# Patient Record
Sex: FEMALE | Race: BLACK OR AFRICAN AMERICAN | NOT HISPANIC OR LATINO | Employment: FULL TIME | URBAN - METROPOLITAN AREA
[De-identification: names, ages, dates, MRNs, and addresses within clinical notes are randomized per-mention and may not be internally consistent; named-entity substitution may affect disease eponyms.]

---
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
## 2021-01-05 ENCOUNTER — POSTPARTUM VISIT (OUTPATIENT)
Dept: OBSTETRICS AND GYNECOLOGY | Facility: CLINIC | Age: 21
End: 2021-01-05

## 2021-01-05 VITALS
HEART RATE: 77 BPM | WEIGHT: 154 LBS | DIASTOLIC BLOOD PRESSURE: 63 MMHG | HEIGHT: 67 IN | SYSTOLIC BLOOD PRESSURE: 105 MMHG | BODY MASS INDEX: 24.17 KG/M2

## 2021-01-05 PROCEDURE — 0503F POSTPARTUM CARE VISIT: CPT | Performed by: OBSTETRICS & GYNECOLOGY

## 2021-01-05 NOTE — PROGRESS NOTES
"Chief Complaint   Patient presents with   • Postpartum Care     Pt desires the nexplanon for contraception         SUBJECTIVE:   Bandar Rodriges is a 20 y.o.  who presents s/p  Precipitous Vaginal Delivery on 2020.  Reports no issues  Bowel and bladder function have returned to normal  Mood changes none  both breast and bottle feeding due to decreased milk supply    OB History    Para Term  AB Living   1 1 1 0 0 1   SAB TAB Ectopic Molar Multiple Live Births   0 0 0   0 1      # Outcome Date GA Lbr Sanjiv/2nd Weight Sex Delivery Anes PTL Lv   1 Term 20 39w5d 11:19  00:49 3546 g (7 lb 13.1 oz) M Vag-Spont EPI N TAMMY      Birth Comments: scale 4          Past Medical History:   Diagnosis Date   • Urogenital trichomoniasis      Past Surgical History:   Procedure Laterality Date   • WISDOM TOOTH EXTRACTION       Social History     Tobacco Use   • Smoking status: Never Smoker   • Smokeless tobacco: Never Used   Substance Use Topics   • Alcohol use: No   • Drug use: Yes     Types: Marijuana     Comment: stopped after prengancy      Family History   Problem Relation Age of Onset   • Hypertension Mother    • Breast cancer Neg Hx    • Ovarian cancer Neg Hx    • Uterine cancer Neg Hx    • Colon cancer Neg Hx    • Deep vein thrombosis Neg Hx    • Pulmonary embolism Neg Hx        Patient Active Problem List   Diagnosis   • Term pregnancy        OBJECTIVE:   /63   Pulse 77   Ht 170.2 cm (67.01\")   Wt 69.9 kg (154 lb)   Breastfeeding Yes   BMI 24.11 kg/m²    Physical Examination:   General appearance - alert, well appearing, and in no distress  Breasts - deferred  Chest - no tachypnea, retractions or cyanosis  Heart - normal rate and regular rhythm  Abdomen - soft, nontender, nondistended, no masses or organomegaly;   Pelvic - normal external genitalia, vulva, vagina, cervix, uterus and adnexa, no bleeding  Neurological - screening mental status exam normal  Musculoskeletal - no joint " tenderness, deformity or swelling  Psychiatric - normal mood and affect    Lab Results   Component Value Date    WBC 14.16 (H) 2020    HGB 8.6 (L) 2020    HCT 25.5 (L) 2020    MCV 84.7 2020     2020        ASSESSMENT:   S/p     PLAN:   Doing well postpartum  Baby doing well  Contraception: Desires Nexplanon. Aware of risks including but not limited to bleeding, infection, bruising, injury to surrounding structures, unscheduled/irregular bleeding, need for other surgery/procedure including at time of removal  At this time, may resume normal activity including intercourse and lifting.  Reviewed normal precautions.  Reviewed pap smear due at 20 yo  Recommended follow up for annual exam or sooner with problems

## 2021-02-02 ENCOUNTER — OFFICE VISIT (OUTPATIENT)
Dept: OBSTETRICS AND GYNECOLOGY | Facility: CLINIC | Age: 21
End: 2021-02-02

## 2021-02-02 VITALS
HEART RATE: 78 BPM | BODY MASS INDEX: 24.48 KG/M2 | SYSTOLIC BLOOD PRESSURE: 109 MMHG | WEIGHT: 156 LBS | DIASTOLIC BLOOD PRESSURE: 73 MMHG | HEIGHT: 67 IN

## 2021-02-02 DIAGNOSIS — Z30.017 ENCOUNTER FOR INITIAL PRESCRIPTION OF IMPLANTABLE SUBDERMAL CONTRACEPTIVE: Primary | ICD-10-CM

## 2021-02-02 LAB
B-HCG UR QL: NEGATIVE
INTERNAL NEGATIVE CONTROL: NEGATIVE
INTERNAL POSITIVE CONTROL: POSITIVE
Lab: NORMAL

## 2021-02-02 PROCEDURE — 81025 URINE PREGNANCY TEST: CPT | Performed by: OBSTETRICS & GYNECOLOGY

## 2021-02-02 PROCEDURE — 11981 INSERTION DRUG DLVR IMPLANT: CPT | Performed by: OBSTETRICS & GYNECOLOGY

## 2021-02-02 NOTE — PROGRESS NOTES
Procedure Note     Pre-Operative Diagnosis: 1. Desire for long acting reversible contraception with Nexplanon    Post-Operative Diagnosis: Same     Procedure: Nexplanon insertion in left arm     Anesthetic: 3mL of 1% lidocaine without epinephrine    Estimated Blood Loss: Nil     Complications: no complications were noted     Disposition: home in good condition     Description of Procedure:   Patient was consented for procedure; questions were answered. Urine pregnancy test was negative.  Patient's dominant hand is her left hand.  Risks were explained to be including but not limited to bruising, bleeding, need for additional surgeries or procedures, injury to surrounding vessels or nerves.  Insertion site was measured, 8cm from medial epicondyle per 's instructions. 3mL of 1% lidocaine injected at insertion site and along insertion track. The skin was then prepped with betadine. The device was inserted in compliance with the 's instructions. The patient was instructed to feel the device after placement. A bandage was applied to the insertion site. Hemostasis was noted. Patient tolerated the procedure well. She was given the package insert and insertion card and instructed to have the device removed in 3 years.       Kasia Bond MD

## 2021-04-16 ENCOUNTER — BULK ORDERING (OUTPATIENT)
Dept: CASE MANAGEMENT | Facility: OTHER | Age: 21
End: 2021-04-16

## 2021-04-16 DIAGNOSIS — Z23 IMMUNIZATION DUE: ICD-10-CM

## 2022-05-27 ENCOUNTER — TELEPHONE (OUTPATIENT)
Dept: OBSTETRICS AND GYNECOLOGY | Facility: CLINIC | Age: 22
End: 2022-05-27

## 2022-08-30 ENCOUNTER — OFFICE VISIT (OUTPATIENT)
Dept: OBSTETRICS AND GYNECOLOGY | Facility: CLINIC | Age: 22
End: 2022-08-30

## 2022-08-30 VITALS — BODY MASS INDEX: 23.17 KG/M2 | SYSTOLIC BLOOD PRESSURE: 112 MMHG | WEIGHT: 148 LBS | DIASTOLIC BLOOD PRESSURE: 65 MMHG

## 2022-08-30 DIAGNOSIS — Z30.46 NEXPLANON REMOVAL: Primary | ICD-10-CM

## 2022-08-30 PROCEDURE — 11982 REMOVE DRUG IMPLANT DEVICE: CPT | Performed by: NURSE PRACTITIONER

## 2022-08-30 NOTE — PROGRESS NOTES
CC: nexplanon removal    Procedure Note     Pre-Operative Diagnosis: 1. Desire for removal of Nexplanon device from left arm    Post-Operative Diagnosis: Same     Procedure: Nexplanon removal    Anesthetic: 3mL 1% plain Xylocaine     Estimated Blood Loss: Nil     Complications: no complications were noted     Counseling: Patient was seen and counseled.  Patient desires removal of the implant device due to AUB with device in place. She is also considering pregnancy.  She was counseled on other contraceptive options.  She was counseled on prenatal vitamin use if no contraceptive option chosen and healthy timing/spacing of pregnancy. The risks of removal were reviewed including bleeding, infection, damage to surrounding structures, and scarring.    Description of Procedure:   Patient was consented for procedure; questions were answered. The implant was identified in her left upper extremity . The area of the distal implant was cleaned with alcohol and injected with 3mL of 1% lidocaine.  The skin was prepped with betadine and incised approximately 0.5cm parallel to the plane of the implant.  The implant was identified and grasped with a hemostat.  The overlying capsule was dissected off with a hemostat, and the implant was removed in its entirety.  The skin was closed with steri strips.  The incision was covered with a pressure dressing.  Patient tolerated the procedure well.     Assessment  Diagnoses and all orders for this visit:    1. Nexplanon removal (Primary)    Encouraged daily PNV and folic acid    NORRIS Rick   8/30/2022  15:12 EDT

## 2023-02-06 NOTE — PROGRESS NOTES
"OB follow up     Bandar Rodriges is a 20 y.o.  34w2d being seen today for her obstetrical visit.  Patient reports pelvic pain after sitting for long periods of time. Feels like \"bones are grinding together\". Fetal movement: normal.    Her prenatal care is complicated by (and status): none    Review of Systems  Cramping/contractions : denies  Vaginal bleeding: denies  Fetal movement normal    /65   Wt 74.8 kg (165 lb)   LMP 2020 (Exact Date)     FHT: 135 BPM   Uterine Size: 32 cm       Assessment    1) Pregnancy at 34w2d   No longer patterning blood sugars as results were all WNL  Flu Vaccine received  Improvement in [place] since decreasing to 8 hour shifts  Encouraged tylenol and maternity belt for pelvic pain  Reviewed fetal kick counts and PTL S&S  GBS at next visit.    Plan    Reviewed this stage of pregnancy  Problem list updated   Follow up in 2 weeks    Peyton Rivera, APRN  10/21/2020  15:36 EDT      " Metronidazole Pregnancy And Lactation Text: This medication is Pregnancy Category B and considered safe during pregnancy.  It is also excreted in breast milk.

## 2023-02-27 ENCOUNTER — INITIAL PRENATAL (OUTPATIENT)
Dept: OBSTETRICS AND GYNECOLOGY | Facility: CLINIC | Age: 23
End: 2023-02-27
Payer: COMMERCIAL

## 2023-02-27 VITALS — DIASTOLIC BLOOD PRESSURE: 74 MMHG | SYSTOLIC BLOOD PRESSURE: 114 MMHG | BODY MASS INDEX: 25.05 KG/M2 | WEIGHT: 160 LBS

## 2023-02-27 DIAGNOSIS — O46.8X1 SUBCHORIONIC HEMORRHAGE OF PLACENTA IN FIRST TRIMESTER, SINGLE OR UNSPECIFIED FETUS: ICD-10-CM

## 2023-02-27 DIAGNOSIS — O41.8X10 SUBCHORIONIC HEMORRHAGE OF PLACENTA IN FIRST TRIMESTER, SINGLE OR UNSPECIFIED FETUS: ICD-10-CM

## 2023-02-27 DIAGNOSIS — Z34.81 MULTIGRAVIDA IN FIRST TRIMESTER: Primary | ICD-10-CM

## 2023-02-27 LAB
GLUCOSE UR STRIP-MCNC: NEGATIVE MG/DL
PROT UR STRIP-MCNC: NEGATIVE MG/DL

## 2023-02-27 PROCEDURE — 99214 OFFICE O/P EST MOD 30 MIN: CPT | Performed by: OBSTETRICS & GYNECOLOGY

## 2023-02-28 LAB
ABO GROUP BLD: NORMAL
BASOPHILS # BLD AUTO: 0 X10E3/UL (ref 0–0.2)
BASOPHILS NFR BLD AUTO: 0 %
BLD GP AB SCN SERPL QL: NEGATIVE
EOSINOPHIL # BLD AUTO: 0.1 X10E3/UL (ref 0–0.4)
EOSINOPHIL NFR BLD AUTO: 1 %
ERYTHROCYTE [DISTWIDTH] IN BLOOD BY AUTOMATED COUNT: 12.9 % (ref 11.7–15.4)
HBV SURFACE AG SERPL QL IA: NEGATIVE
HCT VFR BLD AUTO: 38 % (ref 34–46.6)
HCV IGG SERPL QL IA: NON REACTIVE
HGB BLD-MCNC: 12.8 G/DL (ref 11.1–15.9)
HIV 1+2 AB+HIV1 P24 AG SERPL QL IA: NON REACTIVE
IMM GRANULOCYTES # BLD AUTO: 0 X10E3/UL (ref 0–0.1)
IMM GRANULOCYTES NFR BLD AUTO: 0 %
LYMPHOCYTES # BLD AUTO: 2.9 X10E3/UL (ref 0.7–3.1)
LYMPHOCYTES NFR BLD AUTO: 37 %
MCH RBC QN AUTO: 29.2 PG (ref 26.6–33)
MCHC RBC AUTO-ENTMCNC: 33.7 G/DL (ref 31.5–35.7)
MCV RBC AUTO: 87 FL (ref 79–97)
MONOCYTES # BLD AUTO: 0.6 X10E3/UL (ref 0.1–0.9)
MONOCYTES NFR BLD AUTO: 8 %
NEUTROPHILS # BLD AUTO: 4.1 X10E3/UL (ref 1.4–7)
NEUTROPHILS NFR BLD AUTO: 54 %
PLATELET # BLD AUTO: 239 X10E3/UL (ref 150–450)
RBC # BLD AUTO: 4.38 X10E6/UL (ref 3.77–5.28)
RH BLD: POSITIVE
RPR SER QL: NON REACTIVE
RUBV IGG SERPL IA-ACNC: 2.86 INDEX
VZV IGG SER IA-ACNC: 830 INDEX
WBC # BLD AUTO: 7.7 X10E3/UL (ref 3.4–10.8)

## 2023-02-28 NOTE — PROGRESS NOTES
Cc:  First visit for new pregnancy  Patient was attempting pregnancy and has not been on contraception.  Home testing was positive.  She denies any significant pregnancy symptoms except appetite issues.  Past medical, surgical, obstetric, genetic and social histories reviewed by me.  Allergies and medications reviewed.  Physical exam documented in chart.  Prenatal labs ordered.  Ultrasound with small gestational sac and yolk sac.  No defined fetal pole.  Small subchorionic hemorrhage.  A/P:  IUP in first trimester with subchorionic hemorrhage.  - Repeat sonogram in 2 weeks for viability.  - Discussed importance of stopping THC.  - Recommended vaccinations in pregnancy.  - Discussed set up of our practice, timing of ultrasound and taking of vitamins.

## 2023-03-01 LAB
A VAGINAE DNA VAG QL NAA+PROBE: NORMAL SCORE
BVAB2 DNA VAG QL NAA+PROBE: NORMAL SCORE
C ALBICANS DNA VAG QL NAA+PROBE: NEGATIVE
C GLABRATA DNA VAG QL NAA+PROBE: NEGATIVE
C TRACH DNA VAG QL NAA+PROBE: NEGATIVE
MEGA1 DNA VAG QL NAA+PROBE: NORMAL SCORE
N GONORRHOEA DNA VAG QL NAA+PROBE: NEGATIVE
T VAGINALIS DNA VAG QL NAA+PROBE: NEGATIVE

## 2023-03-02 ENCOUNTER — TELEPHONE (OUTPATIENT)
Dept: OBSTETRICS AND GYNECOLOGY | Facility: CLINIC | Age: 23
End: 2023-03-02

## 2023-03-02 ENCOUNTER — TELEPHONE (OUTPATIENT)
Dept: OBSTETRICS AND GYNECOLOGY | Facility: CLINIC | Age: 23
End: 2023-03-02
Payer: COMMERCIAL

## 2023-03-02 LAB
BACTERIA UR CULT: ABNORMAL
BACTERIA UR CULT: ABNORMAL
OTHER ANTIBIOTIC SUSC ISLT: ABNORMAL

## 2023-03-02 RX ORDER — CEPHALEXIN 500 MG/1
500 CAPSULE ORAL 3 TIMES DAILY
Qty: 21 CAPSULE | Refills: 0 | Status: SHIPPED | OUTPATIENT
Start: 2023-03-02 | End: 2023-03-09

## 2023-03-02 NOTE — TELEPHONE ENCOUNTER
Left message for Rabia that Dr Montaño said that based on the urine sample left in the office, you have a UTI. He has sent in Keflex, an antiobiotic, to your [facility] pharmacy at [address] [facility]. Please increase your water intake to try to get the infection out of your urinary tract and also if needed, eat a cracker with your antibiotic to avoid nausea. We hope you feel better soon. Thank you.

## 2023-03-02 NOTE — TELEPHONE ENCOUNTER
Dora    Let her know that she has a urinary tract infection based on urine sample she left in office.    I called in antibiotics.    Thanks    Danyel

## 2023-03-03 LAB
CONV .: NORMAL
CYTOLOGIST CVX/VAG CYTO: NORMAL
CYTOLOGY CVX/VAG DOC CYTO: NORMAL
CYTOLOGY CVX/VAG DOC THIN PREP: NORMAL
DX ICD CODE: NORMAL
HIV 1 & 2 AB SER-IMP: NORMAL
OTHER STN SPEC: NORMAL
STAT OF ADQ CVX/VAG CYTO-IMP: NORMAL

## 2023-03-20 ENCOUNTER — ROUTINE PRENATAL (OUTPATIENT)
Dept: OBSTETRICS AND GYNECOLOGY | Facility: CLINIC | Age: 23
End: 2023-03-20
Payer: COMMERCIAL

## 2023-03-20 VITALS — BODY MASS INDEX: 23.49 KG/M2 | SYSTOLIC BLOOD PRESSURE: 110 MMHG | WEIGHT: 150 LBS | DIASTOLIC BLOOD PRESSURE: 73 MMHG

## 2023-03-20 DIAGNOSIS — O46.8X1 SUBCHORIONIC HEMORRHAGE OF PLACENTA IN FIRST TRIMESTER, SINGLE OR UNSPECIFIED FETUS: ICD-10-CM

## 2023-03-20 DIAGNOSIS — Z34.81 MULTIGRAVIDA IN FIRST TRIMESTER: Primary | ICD-10-CM

## 2023-03-20 DIAGNOSIS — Z3A.08 8 WEEKS GESTATION OF PREGNANCY: ICD-10-CM

## 2023-03-20 DIAGNOSIS — O41.8X10 SUBCHORIONIC HEMORRHAGE OF PLACENTA IN FIRST TRIMESTER, SINGLE OR UNSPECIFIED FETUS: ICD-10-CM

## 2023-03-20 LAB
GLUCOSE UR STRIP-MCNC: NEGATIVE MG/DL
PROT UR STRIP-MCNC: ABNORMAL MG/DL

## 2023-03-20 PROCEDURE — 99213 OFFICE O/P EST LOW 20 MIN: CPT | Performed by: OBSTETRICS & GYNECOLOGY

## 2023-03-20 RX ORDER — PROMETHAZINE HYDROCHLORIDE 12.5 MG/1
12.5 TABLET ORAL EVERY 6 HOURS PRN
Qty: 20 TABLET | Refills: 0 | Status: SHIPPED | OUTPATIENT
Start: 2023-03-20

## 2023-03-20 NOTE — PROGRESS NOTES
Cc:  Pregnancy follow up.  Patient with nausea and vomiting.  She also having diarrhea. She states earlier today she was not able to keep down any liquids.  No bleeding or spotting.  Vitals reviewed by me.  Gen - alert and pleasant.  Abdomen - gravid, nontender, no guarding or rebound.  Ultrasound with viable 8 week pregnancy.  Small subchorionic hemorrhage noted.  A/P:  IUP at 8 weeks with nausea, hematoma  - Nausea.  Unisom and B complex as first line treatment, the promethazine if does not work.  Discussed dietary modifications.  - Subchorionic hematoma.  Follow up in 3 to 4 weeks and repeat sonogram.

## 2023-04-13 ENCOUNTER — ROUTINE PRENATAL (OUTPATIENT)
Dept: OBSTETRICS AND GYNECOLOGY | Facility: CLINIC | Age: 23
End: 2023-04-13
Payer: COMMERCIAL

## 2023-04-13 ENCOUNTER — HOSPITAL ENCOUNTER (INPATIENT)
Facility: HOSPITAL | Age: 23
LOS: 2 days | Discharge: HOME | DRG: 831 | End: 2023-04-16
Attending: OBSTETRICS & GYNECOLOGY | Admitting: OBSTETRICS & GYNECOLOGY
Payer: COMMERCIAL

## 2023-04-13 VITALS — SYSTOLIC BLOOD PRESSURE: 137 MMHG | DIASTOLIC BLOOD PRESSURE: 65 MMHG | BODY MASS INDEX: 20.51 KG/M2 | WEIGHT: 131 LBS

## 2023-04-13 DIAGNOSIS — O46.8X1 SUBCHORIONIC HEMORRHAGE OF PLACENTA IN FIRST TRIMESTER, SINGLE OR UNSPECIFIED FETUS: ICD-10-CM

## 2023-04-13 DIAGNOSIS — Z34.81 MULTIGRAVIDA IN FIRST TRIMESTER: Primary | ICD-10-CM

## 2023-04-13 DIAGNOSIS — O41.8X10 SUBCHORIONIC HEMORRHAGE OF PLACENTA IN FIRST TRIMESTER, SINGLE OR UNSPECIFIED FETUS: ICD-10-CM

## 2023-04-13 DIAGNOSIS — Z3A.11 11 WEEKS GESTATION OF PREGNANCY: ICD-10-CM

## 2023-04-13 DIAGNOSIS — O21.9 NAUSEA AND VOMITING DURING PREGNANCY: ICD-10-CM

## 2023-04-13 PROBLEM — Z34.90 PREGNANCY: Status: ACTIVE | Noted: 2023-04-13

## 2023-04-13 LAB
ALBUMIN SERPL-MCNC: 5 G/DL (ref 3.5–5.2)
ALBUMIN/GLOB SERPL: 2 G/DL
ALP SERPL-CCNC: 65 U/L (ref 39–117)
ALT SERPL W P-5'-P-CCNC: 30 U/L (ref 1–33)
ANION GAP SERPL CALCULATED.3IONS-SCNC: 15.4 MMOL/L (ref 5–15)
AST SERPL-CCNC: 19 U/L (ref 1–32)
BASOPHILS # BLD AUTO: 0.03 10*3/MM3 (ref 0–0.2)
BASOPHILS NFR BLD AUTO: 0.2 % (ref 0–1.5)
BILIRUB SERPL-MCNC: 1.1 MG/DL (ref 0–1.2)
BUN SERPL-MCNC: 13 MG/DL (ref 6–20)
BUN/CREAT SERPL: 21.3 (ref 7–25)
CALCIUM SPEC-SCNC: 10.1 MG/DL (ref 8.6–10.5)
CHLORIDE SERPL-SCNC: 90 MMOL/L (ref 98–107)
CO2 SERPL-SCNC: 27.6 MMOL/L (ref 22–29)
CREAT SERPL-MCNC: 0.61 MG/DL (ref 0.57–1)
DEPRECATED RDW RBC AUTO: 38.2 FL (ref 37–54)
EGFRCR SERPLBLD CKD-EPI 2021: 129.8 ML/MIN/1.73
EOSINOPHIL # BLD AUTO: 0.04 10*3/MM3 (ref 0–0.4)
EOSINOPHIL NFR BLD AUTO: 0.3 % (ref 0.3–6.2)
ERYTHROCYTE [DISTWIDTH] IN BLOOD BY AUTOMATED COUNT: 13 % (ref 12.3–15.4)
GLOBULIN UR ELPH-MCNC: 2.5 GM/DL
GLUCOSE SERPL-MCNC: 89 MG/DL (ref 65–99)
GLUCOSE UR STRIP-MCNC: NEGATIVE MG/DL
HCT VFR BLD AUTO: 44.6 % (ref 34–46.6)
HGB BLD-MCNC: 16.2 G/DL (ref 12–15.9)
IMM GRANULOCYTES # BLD AUTO: 0.05 10*3/MM3 (ref 0–0.05)
IMM GRANULOCYTES NFR BLD AUTO: 0.4 % (ref 0–0.5)
LYMPHOCYTES # BLD AUTO: 2.23 10*3/MM3 (ref 0.7–3.1)
LYMPHOCYTES NFR BLD AUTO: 17.3 % (ref 19.6–45.3)
MCH RBC QN AUTO: 29.9 PG (ref 26.6–33)
MCHC RBC AUTO-ENTMCNC: 36.3 G/DL (ref 31.5–35.7)
MCV RBC AUTO: 82.3 FL (ref 79–97)
MONOCYTES # BLD AUTO: 1.08 10*3/MM3 (ref 0.1–0.9)
MONOCYTES NFR BLD AUTO: 8.4 % (ref 5–12)
NEUTROPHILS NFR BLD AUTO: 73.4 % (ref 42.7–76)
NEUTROPHILS NFR BLD AUTO: 9.46 10*3/MM3 (ref 1.7–7)
NRBC BLD AUTO-RTO: 0 /100 WBC (ref 0–0.2)
PLATELET # BLD AUTO: 313 10*3/MM3 (ref 140–450)
PMV BLD AUTO: 9.8 FL (ref 6–12)
POTASSIUM SERPL-SCNC: 2.8 MMOL/L (ref 3.5–5.2)
PROT SERPL-MCNC: 7.5 G/DL (ref 6–8.5)
PROT UR STRIP-MCNC: ABNORMAL MG/DL
RBC # BLD AUTO: 5.42 10*6/MM3 (ref 3.77–5.28)
SODIUM SERPL-SCNC: 133 MMOL/L (ref 136–145)
WBC NRBC COR # BLD: 12.89 10*3/MM3 (ref 3.4–10.8)

## 2023-04-13 PROCEDURE — 81001 URINALYSIS AUTO W/SCOPE: CPT | Performed by: OBSTETRICS & GYNECOLOGY

## 2023-04-13 PROCEDURE — 80307 DRUG TEST PRSMV CHEM ANLYZR: CPT | Performed by: OBSTETRICS & GYNECOLOGY

## 2023-04-13 PROCEDURE — 0 POTASSIUM CHLORIDE 10 MEQ/100ML SOLUTION: Performed by: STUDENT IN AN ORGANIZED HEALTH CARE EDUCATION/TRAINING PROGRAM

## 2023-04-13 PROCEDURE — 80053 COMPREHEN METABOLIC PANEL: CPT | Performed by: OBSTETRICS & GYNECOLOGY

## 2023-04-13 PROCEDURE — 25010000002 ONDANSETRON PER 1 MG: Performed by: OBSTETRICS & GYNECOLOGY

## 2023-04-13 PROCEDURE — 85025 COMPLETE CBC W/AUTO DIFF WBC: CPT | Performed by: OBSTETRICS & GYNECOLOGY

## 2023-04-13 RX ORDER — SODIUM CHLORIDE 0.9 % (FLUSH) 0.9 %
10 SYRINGE (ML) INJECTION EVERY 12 HOURS SCHEDULED
OUTPATIENT
Start: 2023-04-13

## 2023-04-13 RX ORDER — SODIUM CHLORIDE 0.9 % (FLUSH) 0.9 %
10 SYRINGE (ML) INJECTION AS NEEDED
OUTPATIENT
Start: 2023-04-13

## 2023-04-13 RX ORDER — SODIUM CHLORIDE, SODIUM LACTATE, POTASSIUM CHLORIDE, CALCIUM CHLORIDE 600; 310; 30; 20 MG/100ML; MG/100ML; MG/100ML; MG/100ML
125 INJECTION, SOLUTION INTRAVENOUS CONTINUOUS
Status: DISCONTINUED | OUTPATIENT
Start: 2023-04-13 | End: 2023-04-14

## 2023-04-13 RX ORDER — DIPHENHYDRAMINE HCL 12.5MG/5ML
12.5 LIQUID (ML) ORAL EVERY 6 HOURS PRN
Status: DISCONTINUED | OUTPATIENT
Start: 2023-04-13 | End: 2023-04-14

## 2023-04-13 RX ORDER — ACETAMINOPHEN 325 MG/1
650 TABLET ORAL EVERY 6 HOURS PRN
Status: DISCONTINUED | OUTPATIENT
Start: 2023-04-13 | End: 2023-04-16 | Stop reason: HOSPADM

## 2023-04-13 RX ORDER — ONDANSETRON 2 MG/ML
4 INJECTION INTRAMUSCULAR; INTRAVENOUS EVERY 6 HOURS PRN
Status: DISCONTINUED | OUTPATIENT
Start: 2023-04-13 | End: 2023-04-16 | Stop reason: HOSPADM

## 2023-04-13 RX ORDER — POTASSIUM CHLORIDE 7.45 MG/ML
10 INJECTION INTRAVENOUS
Status: DISCONTINUED | OUTPATIENT
Start: 2023-04-13 | End: 2023-04-14

## 2023-04-13 RX ORDER — LIDOCAINE HYDROCHLORIDE 10 MG/ML
5 INJECTION, SOLUTION EPIDURAL; INFILTRATION; INTRACAUDAL; PERINEURAL AS NEEDED
OUTPATIENT
Start: 2023-04-13

## 2023-04-13 RX ORDER — FAMOTIDINE 10 MG/ML
20 INJECTION, SOLUTION INTRAVENOUS EVERY 12 HOURS SCHEDULED
Status: DISCONTINUED | OUTPATIENT
Start: 2023-04-13 | End: 2023-04-16 | Stop reason: HOSPADM

## 2023-04-13 RX ORDER — FAMOTIDINE 10 MG/ML
20 INJECTION, SOLUTION INTRAVENOUS EVERY 12 HOURS SCHEDULED
Status: DISCONTINUED | OUTPATIENT
Start: 2023-04-13 | End: 2023-04-13

## 2023-04-13 RX ADMIN — ONDANSETRON 4 MG: 2 INJECTION INTRAMUSCULAR; INTRAVENOUS at 22:51

## 2023-04-13 RX ADMIN — SODIUM CHLORIDE, POTASSIUM CHLORIDE, SODIUM LACTATE AND CALCIUM CHLORIDE 200 ML/HR: 600; 310; 30; 20 INJECTION, SOLUTION INTRAVENOUS at 23:00

## 2023-04-13 RX ADMIN — ONDANSETRON 4 MG: 2 INJECTION INTRAMUSCULAR; INTRAVENOUS at 16:31

## 2023-04-13 RX ADMIN — FAMOTIDINE 20 MG: 10 INJECTION INTRAVENOUS at 16:31

## 2023-04-13 RX ADMIN — SODIUM CHLORIDE, POTASSIUM CHLORIDE, SODIUM LACTATE AND CALCIUM CHLORIDE 125 ML/HR: 600; 310; 30; 20 INJECTION, SOLUTION INTRAVENOUS at 16:10

## 2023-04-13 RX ADMIN — POTASSIUM CHLORIDE 10 MEQ: 7.46 INJECTION, SOLUTION INTRAVENOUS at 23:42

## 2023-04-13 RX ADMIN — POTASSIUM CHLORIDE 10 MEQ: 7.46 INJECTION, SOLUTION INTRAVENOUS at 21:39

## 2023-04-13 NOTE — PLAN OF CARE
Goal Outcome Evaluation:              Outcome Evaluation: Admitted to antepartum service for supportive care for hyperemesis. Labs obtained. Fluids started. medications given as orered. Pt NPO at this time.

## 2023-04-13 NOTE — LETTER
April 16, 2023      [facility]  [address]  [facility] [address]  [phone]          Patient: Bandar Rodriges   YOB: 2000   Date of Visit: 4/13/2023       To Whom It May Concern:    Bandar Rodriges was seen at [facility] on 4/13/2023.    Please excuse Bandar Rodriges from work today from 4/13/23 to 4/16/23.        Sincerely,       JOVI Hatfield RN

## 2023-04-13 NOTE — PROGRESS NOTES
Cc:  Pregnancy follow up.  Patient not able to keep any solid or liquids down for about 3 weeks.  She is not on any medication even though Promethazine was prescribed in past.   She has lost 29 lbs from initial weight intake. She also has heartburn and states she vomits stomach acid.  No bleeding or spotting.  Vitals reviewed.  Gen - patient appears ill.  Abdomen - no guarding or rebound.  Ultrasound with viable 11 week pregnancy  A/P:  IUP at 11+ weeks with hyperemesis gravidarum, history of subchorionic hemorrhage.  - Hyper E.  Patient to be admitted due to severe form of nausea/vomiting.  - Subchorionic hemorrhage.  Resolved.  - Discussed importance of remaining quit from THC and maternal well being.

## 2023-04-13 NOTE — H&P
Patient is a 23 y.o. female  at 11+ weeks admitted for hyperemesis gravidarum.  Patient with ongoing nausea/vomiting that has not been amenable to outpatient treatment.  She has lost nearly 30 lbs since beginning of pregnancy.    Patient Active Problem List   Diagnosis   • Term pregnancy     Prenatal care is complicated by hyperemesis, subchorionic hematoma in first trimester.    Past Medical History:   Diagnosis Date   • Urogenital trichomoniasis        Past Surgical History:   Procedure Laterality Date   • WISDOM TOOTH EXTRACTION         No Known Allergies    Social History     Socioeconomic History   • Marital status: Single   Tobacco Use   • Smoking status: Never   • Smokeless tobacco: Never   Vaping Use   • Vaping Use: Former   • Substances: Nicotine   Substance and Sexual Activity   • Alcohol use: Not Currently   • Drug use: Yes     Types: Marijuana   • Sexual activity: Yes     Partners: Male     Birth control/protection: None       Physical Exam  Gen: ill appearing  Vitals:    23 1515   BP: 114/75   Pulse: 113   Resp: 18   Temp: 97.7 °F (36.5 °C)   SpO2: 99%     HEENT: WNL  Abdomen: No guarding or rebound.      Assessment/Plan: Hyperemesis Gravidarum in 1st trimester  - Labs ordered  - Antiemetics, IVF and strict NPO.        @[facility] Center[facility]@

## 2023-04-14 PROBLEM — O21.1 HYPEREMESIS GRAVIDARUM WITH METABOLIC DISTURBANCE, ANTEPARTUM: Status: ACTIVE | Noted: 2023-04-14

## 2023-04-14 PROBLEM — Z34.90 PREGNANCY: Status: RESOLVED | Noted: 2023-04-13 | Resolved: 2023-04-14

## 2023-04-14 PROBLEM — E87.6 HYPOKALEMIA DUE TO EXCESSIVE GASTROINTESTINAL LOSS OF POTASSIUM: Status: ACTIVE | Noted: 2023-04-14

## 2023-04-14 LAB
AMPHET+METHAMPHET UR QL: NEGATIVE
BACTERIA UR QL AUTO: ABNORMAL /HPF
BARBITURATES UR QL SCN: NEGATIVE
BENZODIAZ UR QL SCN: NEGATIVE
BILIRUB UR QL STRIP: NEGATIVE
CANNABINOIDS SERPL QL: POSITIVE
CLARITY UR: ABNORMAL
COCAINE UR QL: NEGATIVE
COLOR UR: ABNORMAL
GLUCOSE UR STRIP-MCNC: NEGATIVE MG/DL
HGB UR QL STRIP.AUTO: NEGATIVE
HYALINE CASTS UR QL AUTO: ABNORMAL /LPF
KETONES UR QL STRIP: ABNORMAL
LEUKOCYTE ESTERASE UR QL STRIP.AUTO: NEGATIVE
MAGNESIUM SERPL-MCNC: 2.4 MG/DL (ref 1.6–2.6)
METHADONE UR QL SCN: NEGATIVE
NITRITE UR QL STRIP: NEGATIVE
OPIATES UR QL: NEGATIVE
OXYCODONE UR QL SCN: NEGATIVE
PH UR STRIP.AUTO: 5.5 [PH] (ref 5–8)
POTASSIUM SERPL-SCNC: 2.6 MMOL/L (ref 3.5–5.2)
PROT UR QL STRIP: ABNORMAL
RBC # UR STRIP: ABNORMAL /HPF
REF LAB TEST METHOD: ABNORMAL
SP GR UR STRIP: >=1.03 (ref 1–1.03)
SQUAMOUS #/AREA URNS HPF: ABNORMAL /HPF
UROBILINOGEN UR QL STRIP: ABNORMAL
WBC # UR STRIP: ABNORMAL /HPF

## 2023-04-14 PROCEDURE — G0378 HOSPITAL OBSERVATION PER HR: HCPCS

## 2023-04-14 PROCEDURE — 25010000002 THIAMINE PER 100 MG: Performed by: OBSTETRICS & GYNECOLOGY

## 2023-04-14 PROCEDURE — 83735 ASSAY OF MAGNESIUM: CPT | Performed by: OBSTETRICS & GYNECOLOGY

## 2023-04-14 PROCEDURE — 0 POTASSIUM CHLORIDE 10 MEQ/100ML SOLUTION: Performed by: STUDENT IN AN ORGANIZED HEALTH CARE EDUCATION/TRAINING PROGRAM

## 2023-04-14 PROCEDURE — 81001 URINALYSIS AUTO W/SCOPE: CPT | Performed by: OBSTETRICS & GYNECOLOGY

## 2023-04-14 PROCEDURE — 84132 ASSAY OF SERUM POTASSIUM: CPT | Performed by: OBSTETRICS & GYNECOLOGY

## 2023-04-14 PROCEDURE — 99233 SBSQ HOSP IP/OBS HIGH 50: CPT | Performed by: OBSTETRICS & GYNECOLOGY

## 2023-04-14 RX ORDER — POTASSIUM CHLORIDE 7.45 MG/ML
10 INJECTION INTRAVENOUS
Status: DISCONTINUED | OUTPATIENT
Start: 2023-04-14 | End: 2023-04-16 | Stop reason: HOSPADM

## 2023-04-14 RX ORDER — PANTOPRAZOLE SODIUM 40 MG/1
40 TABLET, DELAYED RELEASE ORAL
Status: DISCONTINUED | OUTPATIENT
Start: 2023-04-14 | End: 2023-04-16 | Stop reason: HOSPADM

## 2023-04-14 RX ORDER — POTASSIUM CHLORIDE 1.5 G/1.77G
40 POWDER, FOR SOLUTION ORAL AS NEEDED
Status: DISCONTINUED | OUTPATIENT
Start: 2023-04-14 | End: 2023-04-16 | Stop reason: HOSPADM

## 2023-04-14 RX ORDER — DEXTROSE, SODIUM CHLORIDE, AND POTASSIUM CHLORIDE 5; .9; .15 G/100ML; G/100ML; G/100ML
125 INJECTION INTRAVENOUS CONTINUOUS
Status: DISCONTINUED | OUTPATIENT
Start: 2023-04-14 | End: 2023-04-16 | Stop reason: HOSPADM

## 2023-04-14 RX ORDER — DOXYLAMINE SUCCINATE AND PYRIDOXINE HYDROCHLORIDE, DELAYED RELEASE TABLETS 10 MG/10 MG 10; 10 MG/1; MG/1
1 TABLET, DELAYED RELEASE ORAL 3 TIMES DAILY
Status: DISCONTINUED | OUTPATIENT
Start: 2023-04-14 | End: 2023-04-16 | Stop reason: HOSPADM

## 2023-04-14 RX ORDER — POTASSIUM CHLORIDE 750 MG/1
40 TABLET, FILM COATED, EXTENDED RELEASE ORAL AS NEEDED
Status: DISCONTINUED | OUTPATIENT
Start: 2023-04-14 | End: 2023-04-16 | Stop reason: HOSPADM

## 2023-04-14 RX ADMIN — PANTOPRAZOLE SODIUM 40 MG: 40 TABLET, DELAYED RELEASE ORAL at 14:51

## 2023-04-14 RX ADMIN — ACETAMINOPHEN 650 MG: 325 TABLET ORAL at 14:51

## 2023-04-14 RX ADMIN — SODIUM CHLORIDE, POTASSIUM CHLORIDE, SODIUM LACTATE AND CALCIUM CHLORIDE 200 ML/HR: 600; 310; 30; 20 INJECTION, SOLUTION INTRAVENOUS at 04:05

## 2023-04-14 RX ADMIN — FAMOTIDINE 20 MG: 10 INJECTION INTRAVENOUS at 09:25

## 2023-04-14 RX ADMIN — FAMOTIDINE 20 MG: 10 INJECTION INTRAVENOUS at 22:07

## 2023-04-14 RX ADMIN — POTASSIUM CHLORIDE, DEXTROSE MONOHYDRATE AND SODIUM CHLORIDE 125 ML/HR: 150; 5; 900 INJECTION, SOLUTION INTRAVENOUS at 16:44

## 2023-04-14 RX ADMIN — POTASSIUM CHLORIDE 10 MEQ: 7.46 INJECTION, SOLUTION INTRAVENOUS at 03:26

## 2023-04-14 RX ADMIN — POTASSIUM CHLORIDE 10 MEQ: 7.46 INJECTION, SOLUTION INTRAVENOUS at 01:40

## 2023-04-14 RX ADMIN — POTASSIUM CHLORIDE 10 MEQ: 7.46 INJECTION, SOLUTION INTRAVENOUS at 07:11

## 2023-04-14 RX ADMIN — DOXYLAMINE SUCCINATE AND PYRIDOXINE HYDROCHLORIDE, DELAYED RELEASE TABLETS 10 MG/10 MG 1 TABLET: 10; 10 TABLET, DELAYED RELEASE ORAL at 16:24

## 2023-04-14 RX ADMIN — POTASSIUM CHLORIDE 40 MEQ: 750 TABLET, EXTENDED RELEASE ORAL at 23:44

## 2023-04-14 RX ADMIN — DIPHENHYDRAMINE HYDROCHLORIDE 12.5 MG: 12.5 SOLUTION ORAL at 05:36

## 2023-04-14 RX ADMIN — THIAMINE HYDROCHLORIDE 100 MG: 100 INJECTION, SOLUTION INTRAMUSCULAR; INTRAVENOUS at 14:43

## 2023-04-14 RX ADMIN — POTASSIUM CHLORIDE 40 MEQ: 750 TABLET, EXTENDED RELEASE ORAL at 19:22

## 2023-04-14 RX ADMIN — DOXYLAMINE SUCCINATE AND PYRIDOXINE HYDROCHLORIDE, DELAYED RELEASE TABLETS 10 MG/10 MG 1 TABLET: 10; 10 TABLET, DELAYED RELEASE ORAL at 22:06

## 2023-04-14 RX ADMIN — POTASSIUM CHLORIDE 10 MEQ: 7.46 INJECTION, SOLUTION INTRAVENOUS at 05:19

## 2023-04-14 NOTE — CONSULTS
Nutrition Services    Patient Name:  Bandar Rodriges  YOB: 2000  MRN: [id number]  Admit Date:  4/13/2023    Assessment Date:  04/14/23    Nutrition assessment-consult for MST score-4, RN consult. 11weeks  Patient admitted with severe hyperemesis.   Reported down 30# in the past month, unable to keep anything down the past 1.5 weeks. Unable to take prenatal vitamin.  Based on ASPEN/AND criteria, patient qualifies for Severe Malnutrition of Acute illness.  Provided nutrition therapy. Currently on IV fluids. Advanced to clear liquids and tolerating sips of ginger ale. Patient agreed to try boost strawberry once diet advanced to fulls.  Will follow clinical course.    Malnutrition Severity Assessment      Patient meets criteria for : Severe Malnutrition  Malnutrition Type (last 8 hours)     Malnutrition Severity Assessment     Row Name 04/14/23 1430       Malnutrition Severity Assessment    Malnutrition Type Acute Disease or Injury - Related Malnutrition    Row Name 04/14/23 1430       Insufficient Energy Intake     Insufficient Energy Intake Findings Severe    Insufficient Energy Intake  <75% of est. energy requirement for > or equal to 1 month  minimal po intake for the past month due to hyperemesis-past 1.5 weeks have been the worst    Row Name 04/14/23 1430       Unintentional Weight Loss     Unintentional Weight Loss Findings Severe    Unintentional Weight Loss  Weight loss greater than 5% in one month  18% weight loss in the past month    Row Name 04/14/23 1430       Muscle Loss    Loss of Muscle Mass Findings Moderate    Clavicle Bone Region Moderate - some protrusion in females, visible in males    Row Name 04/14/23 1430       Criteria Met (Must meet criteria for severity in at least 2 of these categories: M Wasting, Fat Loss, Fluid, Secondary Signs, Wt. Status, Intake)    Patient meets criteria for  Severe Malnutrition                   CLINICAL NUTRITION ASSESSMENT      Reason for Assessment  "Chronic Poor Intake , MST score 2+, Nurse or Nurse Practitioner Consult     Diagnosis/Problem   Hyperemesis in the first trimester    Medical/Surgical History Past Medical History:   Diagnosis Date   • Urogenital trichomoniasis        Past Surgical History:   Procedure Laterality Date   • WISDOM TOOTH EXTRACTION          Encounter Information        Nutrition History:     Food Preferences:    Supplements:    Factors Affecting Intake: decreased appetite, fatigue, nausea, vomiting     Anthropometrics        Current Height  Current Weight  BMI kg/m2 Height: 170.2 cm (67\")  Weight: 59.4 kg (131 lb) (04/13/23 1519)  Body mass index is 20.52 kg/m².   Adjusted BMI (if applicable)        Admission Weight 131#       Ideal Body Weight (IBW) 135#   Adjusted IBW (if applicable)        Usual Body Weight (UBW) 160#   Weight Change/Trend Loss-pregnancy with hyperemesis        Weight History Wt Readings from Last 30 Encounters:   04/13/23 1519 59.4 kg (131 lb)   04/13/23 1230 59.4 kg (131 lb)   03/20/23 1533 68 kg (150 lb)   02/27/23 1340 72.6 kg (160 lb)   08/30/22 1454 67.1 kg (148 lb)   02/02/21 1437 70.8 kg (156 lb) (85 %, Z= 1.02)*   01/05/21 1452 69.9 kg (154 lb) (83 %, Z= 0.96)*   11/28/20 0514 78.5 kg (173 lb) (93 %, Z= 1.45)*   11/24/20 1517 78.9 kg (174 lb) (93 %, Z= 1.47)*   11/19/20 1545 78.5 kg (173 lb) (93 %, Z= 1.45)*   11/10/20 1536 77.6 kg (171 lb) (92 %, Z= 1.41)*   11/03/20 1531 76.7 kg (169 lb) (91 %, Z= 1.37)*   10/21/20 1530 74.8 kg (165 lb) (90 %, Z= 1.27)*   10/06/20 1616 73.5 kg (162 lb) (89 %, Z= 1.20)*   09/25/20 1141 73 kg (161 lb) (88 %, Z= 1.18)*   09/22/20 0949 75.3 kg (166 lb) (90 %, Z= 1.30)*   09/01/20 1306 73 kg (161 lb) (88 %, Z= 1.18)*   08/04/20 1518 72.1 kg (159 lb) (87 %, Z= 1.13)*   07/07/20 1500 68.5 kg (151 lb) (82 %, Z= 0.91)*   06/02/20 1609 64.9 kg (143 lb) (75 %, Z= 0.66)*   05/05/20 1400 64.9 kg (143 lb) (75 %, Z= 0.67)*   05/11/17 1259 68.9 kg (152 lb) (88 %, Z= 1.19)*   05/04/17 " 1343 67.1 kg (148 lb) (86 %, Z= 1.08)*     * Growth percentiles are based on [facility] (Girls, 2-20 Years) data.           --  Tests/Procedures        Tests/Procedures No new tests/procedures     Labs       Pertinent Labs    Results from last 7 days   Lab Units 04/13/23  1842   SODIUM mmol/L 133*   POTASSIUM mmol/L 2.8*   CHLORIDE mmol/L 90*   CO2 mmol/L 27.6   BUN mg/dL 13   CREATININE mg/dL 0.61   CALCIUM mg/dL 10.1   BILIRUBIN mg/dL 1.1   ALK PHOS U/L 65   ALT (SGPT) U/L 30   AST (SGOT) U/L 19   GLUCOSE mg/dL 89     Results from last 7 days   Lab Units 04/13/23  1842 04/13/23  1611   HEMOGLOBIN g/dL  --  16.2*   HEMATOCRIT %  --  44.6   WBC 10*3/mm3  --  12.89*   ALBUMIN g/dL 5.0  --      Results from last 7 days   Lab Units 04/13/23  1611   PLATELETS 10*3/mm3 313     COVID19   Date Value Ref Range Status   11/28/2020 Not Detected Not Detected - Ref. Range Final     No results found for: HGBA1C       Medications           Scheduled Medications doxylamine-pyridoxine, 1 tablet, Oral, TID  famotidine, 20 mg, Intravenous, Q12H  pantoprazole, 40 mg, Oral, QAM AC  thiamine (VITAMIN B1) IVPB, 100 mg, Intravenous, Daily       Infusions dextrose 5 % and sodium chloride 0.9 % with KCl 20 mEq, 125 mL/hr  lactated ringers, 125 mL/hr, Last Rate: 200 mL/hr (04/14/23 0405)       PRN Medications •  acetaminophen  •  ondansetron  •  potassium chloride     Physical Findings          Physical Appearance alert, frail   Oral/Mouth Cavity WNL   Edema  no edema   Gastrointestinal nausea, vomiting, last bowel movement:4/6   Skin  skin intact   Tubes/Drains none   NFPE Consented to exam, See Malnutrition Severity Assessment, Date Completed: 4/14   --  Current Nutrition Orders & Evaluation of Intake       Oral Nutrition     Food Allergies NKFA   Current PO Diet Diet: Liquid Diets; Clear Liquid; Texture: Regular Texture (IDDSI 7); Fluid Consistency: Thin (IDDSI 0)   Supplement n/a   PO Evaluation     % PO Intake     # of Days Evaluated   "  --  Estimated/Assessed Needs       Energy Requirements    Height for Calculation  Height: 170.2 cm (67\")   Weight for Calculation    Method for Estimation     EST Needs (kcal/day) 6799-4511 kcal (30-35 kcal/kg)       Protein Requirements    Weight for Calculation    EST Protein Needs (g/kg) 1.0-1.5 gm pro/kg   EST Daily Needs (g/day) 60-90 gm pro/kg       Fluid Requirements     Estimated Needs (mL/day) 1800 ml         PES STATEMENT / NUTRITION DIAGNOSIS      Nutrition Dx Problem  Problem: Malnutrition  Etiology: Medical Diagnosis  hyperemesis  Signs/Symptoms: Report of Minimal PO Intake    Comment:    --  NUTRITION INTERVENTION / PLAN OF CARE      Intervention Goal(s) Reduce/improve symptoms         RD Intervention/Action Supplement provided, Encourage intake, Follow Tx Progress and Care plan reviewed         Prescription/Orders:       PO Diet       Supplements Boost strawberry BID      Snacks       Enteral Nutrition       Parenteral Nutrition    New Prescription Ordered? Yes   --      Monitor/Evaluation Per protocol   Discharge Plan/Needs No discharge needs identified at this time   Education Will instruct as appropriate   --    RD to follow per protocol.      Electronically signed by:  Ludmila Garrido RD  04/14/23 14:29 EDT  "

## 2023-04-14 NOTE — PROGRESS NOTES
[facility]     Progress Note    Patient Name: Bandar Rodriges  : 2000  MRN: [id number]  Primary Care Physician:  Elle Coleman MD  Date of admission: 2023    Subjective   Subjective     Chief Complaint: Nausea and vomiting    HPI:  Patient Reports persistent nausea this morning, but no emesis.  She says she has not tried to eat or drink anything today.  She says that she would like to try to drink something today.  She reports some mild burning in her chest and throat.  She thinks this is due to the nausea.  She also reports a mild headache.  No shortness of breath.  No fevers or chills.    Review of Systems  Constitutional: No fevers, chills, sweats   Eye: No recent visual problems, denies blurry vision   HEENT: No ear pain, nasal congestion, sore throat, voice changes   Respiratory: No shortness of breath, cough, pain on breathing   Cardiovascular: No Chest pain, palpitations   Gastrointestinal: Pos nausea; vomiting, diarrhea, constipation   Genitourinary: No hematuria, dysuria, lesions on genitalia   Hema/Lymph: Negative for bruising, no edema   Endocrine: Negative for excessive thirst, excessive hunger, heat or cold intolerance   Musculoskeletal: No joint pain, muscle pain, decreased range of motion   Integumentary: No rash, pruritus, abrasions, lesions   Neurologic: No weakness, numbness, headaches   Psychiatric: No anxiety, depression, mood changes           Objective   Objective     Vitals:   Temp:  [97.7 °F (36.5 °C)-98.3 °F (36.8 °C)] 98.3 °F (36.8 °C)  Heart Rate:  [] 87  Resp:  [14-18] 14  BP: (108-114)/(59-75) 110/59  Physical Exam     General: No acute distress, awake and oriented x3   HEENT: Normocephalic atraumatic, moist mucous membranes   Eyes: Pupils equal round reactive to light and accommodation, extraocular muscles intact   Respiratory: Normal work of breathing, good air movement   Abdomen: Soft, nontender, nondistended  Extremities: No calf tenderness, no lower  extremity edema   Psychiatric: Good judgment and insight, normal affect and mood   Neurologic: Cranial nerves II through XII intact, no deficits   Skin: No rashes or lesions         Result Review    Result Review:  I have personally reviewed the results from the time of this admission to 4/14/2023 13:45 EDT and agree with these findings:  [x]  Laboratory list / accordion  []  Microbiology  []  Radiology  []  EKG/Telemetry   []  Cardiology/Vascular   []  Pathology  []  Old records  []  Other:  Most notable findings include:     Admission on 04/13/2023   Component Date Value Ref Range Status   • Glucose 04/13/2023 89  65 - 99 mg/dL Final   • BUN 04/13/2023 13  6 - 20 mg/dL Final   • Creatinine 04/13/2023 0.61  0.57 - 1.00 mg/dL Final   • Sodium 04/13/2023 133 (L)  136 - 145 mmol/L Final   • Potassium 04/13/2023 2.8 (L)  3.5 - 5.2 mmol/L Final   • Chloride 04/13/2023 90 (L)  98 - 107 mmol/L Final   • CO2 04/13/2023 27.6  22.0 - 29.0 mmol/L Final   • Calcium 04/13/2023 10.1  8.6 - 10.5 mg/dL Final   • Total Protein 04/13/2023 7.5  6.0 - 8.5 g/dL Final   • Albumin 04/13/2023 5.0  3.5 - 5.2 g/dL Final   • ALT (SGPT) 04/13/2023 30  1 - 33 U/L Final   • AST (SGOT) 04/13/2023 19  1 - 32 U/L Final   • Alkaline Phosphatase 04/13/2023 65  39 - 117 U/L Final   • Total Bilirubin 04/13/2023 1.1  0.0 - 1.2 mg/dL Final   • Globulin 04/13/2023 2.5  gm/dL Final   • A/G Ratio 04/13/2023 2.0  g/dL Final   • BUN/Creatinine Ratio 04/13/2023 21.3  7.0 - 25.0 Final   • Anion Gap 04/13/2023 15.4 (H)  5.0 - 15.0 mmol/L Final   • eGFR 04/13/2023 129.8  >60.0 mL/min/1.73 Final   • Amphet/Methamphet, Screen 04/13/2023 Negative  Negative Final   • Barbiturates Screen, Urine 04/13/2023 Negative  Negative Final   • Benzodiazepine Screen, Urine 04/13/2023 Negative  Negative Final   • Cocaine Screen, Urine 04/13/2023 Negative  Negative Final   • Opiate Screen 04/13/2023 Negative  Negative Final   • THC, Screen, Urine 04/13/2023 Positive (A)  Negative  Final   • Methadone Screen, Urine 2023 Negative  Negative Final   • Oxycodone Screen, Urine 2023 Negative  Negative Final   • WBC 2023 12.89 (H)  3.40 - 10.80 10*3/mm3 Final   • RBC 2023 5.42 (H)  3.77 - 5.28 10*6/mm3 Final   • Hemoglobin 2023 16.2 (H)  12.0 - 15.9 g/dL Final   • Hematocrit 2023 44.6  34.0 - 46.6 % Final   • MCV 2023 82.3  79.0 - 97.0 fL Final   • MCH 2023 29.9  26.6 - 33.0 pg Final   • MCHC 2023 36.3 (H)  31.5 - 35.7 g/dL Final   • RDW 2023 13.0  12.3 - 15.4 % Final   • RDW-SD 2023 38.2  37.0 - 54.0 fl Final   • MPV 2023 9.8  6.0 - 12.0 fL Final   • Platelets 2023 313  140 - 450 10*3/mm3 Final   • Neutrophil % 2023 73.4  42.7 - 76.0 % Final   • Lymphocyte % 2023 17.3 (L)  19.6 - 45.3 % Final   • Monocyte % 2023 8.4  5.0 - 12.0 % Final   • Eosinophil % 2023 0.3  0.3 - 6.2 % Final   • Basophil % 2023 0.2  0.0 - 1.5 % Final   • Immature Grans % 2023 0.4  0.0 - 0.5 % Final   • Neutrophils, Absolute 2023 9.46 (H)  1.70 - 7.00 10*3/mm3 Final   • Lymphocytes, Absolute 2023 2.23  0.70 - 3.10 10*3/mm3 Final   • Monocytes, Absolute 2023 1.08 (H)  0.10 - 0.90 10*3/mm3 Final   • Eosinophils, Absolute 2023 0.04  0.00 - 0.40 10*3/mm3 Final   • Basophils, Absolute 2023 0.03  0.00 - 0.20 10*3/mm3 Final   • Immature Grans, Absolute 2023 0.05  0.00 - 0.05 10*3/mm3 Final   • nRBC 2023 0.0  0.0 - 0.2 /100 WBC Final         Assessment & Plan   Assessment / Plan     Brief Patient Summary:  Bandar Rodriges is a 23 y.o. female  2 para 1 at 11 weeks and 4 days with hyperemesis gravidarum with metabolic disturbance  2.  Hypokalemia due to GI losses    Active Hospital Problems:  Active Hospital Problems    Diagnosis    • **Hyperemesis gravidarum with metabolic disturbance, antepartum    • Hypokalemia due to excessive gastrointestinal loss of potassium      Plan:     1.  We will add vitamin B6 and doxylamine as first-line treatment of nausea vomiting in pregnancy.  2.  Patient with persistent nausea vomiting pregnancy often has some element of gastritis/esophagitis.  As such, I would add a PPI to her regimen.  3.  Thiamine replacement.  4.  After thiamine replacement, would start D5 NS with KCl for maintenance fluids.  5.  Replace potassium according to protocol  6.  Daily weights  7.  Urinalysis for ketones.  Continue every 24 until ketonuria resolved.  8.  We will try to start to advance diet today first to clear liquid diet, then ultimately to regular diet if she tolerates.    I spent 50 minutes today on the care of this patient, including review of the chart and any pertinent prior imaging and labs, interview/exam of the patient, developing care plan, and counseling the patient on management options and excluding any time spent on other separate billable services such as performing procedures or test interpretation, if applicable.      DVT prophylaxis:  Mechanical DVT prophylaxis orders are present.    CODE STATUS:   Code Status (Patient has no pulse and is not breathing): CPR (Attempt to Resuscitate)  Medical Interventions (Patient has pulse or is breathing): Full    Disposition:  I expect patient to be discharged in 1-2 days.    William Curtis MD

## 2023-04-14 NOTE — PLAN OF CARE
Problem: Adult Inpatient Plan of Care  Goal: Plan of Care Review  Outcome: Ongoing, Progressing  Goal: Patient-Specific Goal (Individualized)  Outcome: Ongoing, Progressing  Goal: Absence of Hospital-Acquired Illness or Injury  Outcome: Ongoing, Progressing  Intervention: Identify and Manage Fall Risk  Intervention: Prevent and Manage VTE (Venous Thromboembolism) Risk  Goal: Optimal Comfort and Wellbeing  Outcome: Ongoing, Progressing  Intervention: Provide Person-Centered Care  Goal: Readiness for Transition of Care  Outcome: Ongoing, Progressing     Problem: Hyperemesis Gravidarum  Goal: Nausea and Vomiting Relief  Outcome: Ongoing, Progressing  Intervention: Monitor and Manage Hypovolemia   Goal Outcome Evaluation:  Plan of Care Reviewed With: patient, significant other        Progress: improving  Outcome Evaluation: VSS. On potassium protocol.  Unable to tolerate at normal dosing so has been halved. C/O sore throat. No vomiting this shift but spitting lots of clear liquid. Pt states she feels better now that she has in weeks. Urine is extremely concentrated. Pt feeling better with iv hydration. Also states she is finally able to rest, despite frequent awakenings for potassium runs. Pt hoping to advance diet today as she states she is hungry.

## 2023-04-15 PROBLEM — E43 SEVERE MALNUTRITION: Status: ACTIVE | Noted: 2023-04-15

## 2023-04-15 PROBLEM — Z34.90 PREGNANT: Status: ACTIVE | Noted: 2023-04-15

## 2023-04-15 LAB
ALBUMIN SERPL-MCNC: 3.4 G/DL (ref 3.5–5.2)
ALBUMIN/GLOB SERPL: 2.3 G/DL
ALP SERPL-CCNC: 42 U/L (ref 39–117)
ALT SERPL W P-5'-P-CCNC: 22 U/L (ref 1–33)
ANION GAP SERPL CALCULATED.3IONS-SCNC: 5.6 MMOL/L (ref 5–15)
AST SERPL-CCNC: 13 U/L (ref 1–32)
BACTERIA UR QL AUTO: ABNORMAL /HPF
BILIRUB SERPL-MCNC: 0.5 MG/DL (ref 0–1.2)
BILIRUB UR QL STRIP: NEGATIVE
BUN SERPL-MCNC: 4 MG/DL (ref 6–20)
BUN/CREAT SERPL: 7.5 (ref 7–25)
CALCIUM SPEC-SCNC: 8.5 MG/DL (ref 8.6–10.5)
CHLORIDE SERPL-SCNC: 108 MMOL/L (ref 98–107)
CLARITY UR: ABNORMAL
CO2 SERPL-SCNC: 26.4 MMOL/L (ref 22–29)
COLOR UR: YELLOW
CREAT SERPL-MCNC: 0.53 MG/DL (ref 0.57–1)
EGFRCR SERPLBLD CKD-EPI 2021: 134.3 ML/MIN/1.73
GLOBULIN UR ELPH-MCNC: 1.5 GM/DL
GLUCOSE SERPL-MCNC: 85 MG/DL (ref 65–99)
GLUCOSE UR STRIP-MCNC: NEGATIVE MG/DL
HGB UR QL STRIP.AUTO: NEGATIVE
HYALINE CASTS UR QL AUTO: ABNORMAL /LPF
KETONES UR QL STRIP: NEGATIVE
LEUKOCYTE ESTERASE UR QL STRIP.AUTO: ABNORMAL
NITRITE UR QL STRIP: NEGATIVE
PH UR STRIP.AUTO: 7.5 [PH] (ref 5–8)
POTASSIUM SERPL-SCNC: 4.1 MMOL/L (ref 3.5–5.2)
POTASSIUM SERPL-SCNC: 4.1 MMOL/L (ref 3.5–5.2)
PROT SERPL-MCNC: 4.9 G/DL (ref 6–8.5)
PROT UR QL STRIP: NEGATIVE
RBC # UR STRIP: ABNORMAL /HPF
REF LAB TEST METHOD: ABNORMAL
SODIUM SERPL-SCNC: 140 MMOL/L (ref 136–145)
SP GR UR STRIP: <1.005 (ref 1–1.03)
SQUAMOUS #/AREA URNS HPF: ABNORMAL /HPF
UROBILINOGEN UR QL STRIP: ABNORMAL
WBC # UR STRIP: ABNORMAL /HPF

## 2023-04-15 PROCEDURE — 99231 SBSQ HOSP IP/OBS SF/LOW 25: CPT | Performed by: OBSTETRICS & GYNECOLOGY

## 2023-04-15 PROCEDURE — 25010000002 THIAMINE PER 100 MG: Performed by: OBSTETRICS & GYNECOLOGY

## 2023-04-15 PROCEDURE — 80053 COMPREHEN METABOLIC PANEL: CPT | Performed by: OBSTETRICS & GYNECOLOGY

## 2023-04-15 PROCEDURE — 84132 ASSAY OF SERUM POTASSIUM: CPT | Performed by: OBSTETRICS & GYNECOLOGY

## 2023-04-15 RX ADMIN — DOXYLAMINE SUCCINATE AND PYRIDOXINE HYDROCHLORIDE, DELAYED RELEASE TABLETS 10 MG/10 MG 1 TABLET: 10; 10 TABLET, DELAYED RELEASE ORAL at 22:47

## 2023-04-15 RX ADMIN — POTASSIUM CHLORIDE 40 MEQ: 750 TABLET, EXTENDED RELEASE ORAL at 03:54

## 2023-04-15 RX ADMIN — FAMOTIDINE 20 MG: 10 INJECTION INTRAVENOUS at 08:39

## 2023-04-15 RX ADMIN — PANTOPRAZOLE SODIUM 40 MG: 40 TABLET, DELAYED RELEASE ORAL at 08:37

## 2023-04-15 RX ADMIN — FAMOTIDINE 20 MG: 10 INJECTION INTRAVENOUS at 21:29

## 2023-04-15 RX ADMIN — POTASSIUM CHLORIDE, DEXTROSE MONOHYDRATE AND SODIUM CHLORIDE 125 ML/HR: 150; 5; 900 INJECTION, SOLUTION INTRAVENOUS at 00:44

## 2023-04-15 RX ADMIN — ACETAMINOPHEN 650 MG: 325 TABLET ORAL at 11:33

## 2023-04-15 RX ADMIN — POTASSIUM CHLORIDE, DEXTROSE MONOHYDRATE AND SODIUM CHLORIDE 125 ML/HR: 150; 5; 900 INJECTION, SOLUTION INTRAVENOUS at 18:45

## 2023-04-15 RX ADMIN — DOXYLAMINE SUCCINATE AND PYRIDOXINE HYDROCHLORIDE, DELAYED RELEASE TABLETS 10 MG/10 MG 1 TABLET: 10; 10 TABLET, DELAYED RELEASE ORAL at 08:37

## 2023-04-15 RX ADMIN — DOXYLAMINE SUCCINATE AND PYRIDOXINE HYDROCHLORIDE, DELAYED RELEASE TABLETS 10 MG/10 MG 1 TABLET: 10; 10 TABLET, DELAYED RELEASE ORAL at 17:20

## 2023-04-15 RX ADMIN — THIAMINE HYDROCHLORIDE 100 MG: 100 INJECTION, SOLUTION INTRAMUSCULAR; INTRAVENOUS at 11:34

## 2023-04-15 RX ADMIN — POTASSIUM CHLORIDE, DEXTROSE MONOHYDRATE AND SODIUM CHLORIDE 125 ML/HR: 150; 5; 900 INJECTION, SOLUTION INTRAVENOUS at 09:07

## 2023-04-15 NOTE — PLAN OF CARE
Problem: Adult Inpatient Plan of Care  Goal: Plan of Care Review  Outcome: Ongoing, Progressing  Flowsheets  Taken 4/15/2023 1700 by Delmy Hatfield, RN  Outcome Evaluation: VSS. No emesis this shift. Patient keeping down regular diet trays for lunch and dinner. Labs were drawn this morning and potassium has reached normal level. Patient output within normal limits and yellow, clear. Will continue to monitor and further assess.  Taken 4/15/2023 0644 by Britany Yusuf RN  Plan of Care Reviewed With: patient   Goal Outcome Evaluation:              Outcome Evaluation: VSS. No emesis this shift. Patient keeping down regular diet trays for lunch and dinner. Labs were drawn this morning and potassium has reached normal level. Patient output within normal limits and yellow, clear. Will continue to monitor and further assess.

## 2023-04-15 NOTE — PROGRESS NOTES
Hospital Day #3      Patient name: Bandar Rodriges  Age: 23 y.o.  Sex: female  YOB: 2000   MRN: [id number]  Admission Date: 4/13/2023    Gestational age: 11w5d    CC:   Hyperemesis gravidarum with metabolic disturbance, antepartum    Hypokalemia due to excessive gastrointestinal loss of potassium    Pregnant      Subjective:    Patient reports less nausea today and has not had any emesis since her admission.  She has tolerated small amounts of clear liquid.  She does desire to increase her diet today.      ROS: Negative for fever or chills.  Positive for nausea.  Objective:      Vitals:    04/15/23 0826   BP: 105/70   Pulse: 89   Resp: 18   Temp: 98.3 °F (36.8 °C)   SpO2: 100%       normal general appearance and in no apparent distress      Exam:     Abdomen: Soft, non tender, non distended.    Extremities: no fabiola    Labs:  Lab Results   Component Value Date    WBC 12.89 (H) 04/13/2023    HGB 16.2 (H) 04/13/2023    HCT 44.6 04/13/2023    MCV 82.3 04/13/2023     04/13/2023               Assessment:      11-week 5-day intrauterine gestation with hyperemesis gravidarum.  Hypokalemia has resolved and potassium 4.1 now.  Urine ketones negative last evening.    Plan:       Increased to regular diet today and plan for discharge home tomorrow if she tolerates all this.    .    Josiah Wang MD  4/15/2023  10:11 EDT

## 2023-04-15 NOTE — PLAN OF CARE
Problem: Adult Inpatient Plan of Care  Goal: Plan of Care Review  Outcome: Ongoing, Progressing  Goal: Patient-Specific Goal (Individualized)  Outcome: Ongoing, Progressing  Goal: Absence of Hospital-Acquired Illness or Injury  Outcome: Ongoing, Progressing  Intervention: Identify and Manage Fall Risk  Intervention: Prevent and Manage VTE (Venous Thromboembolism) Risk  Goal: Optimal Comfort and Wellbeing  Outcome: Ongoing, Progressing  Intervention: Provide Person-Centered Care  Goal: Readiness for Transition of Care  Outcome: Ongoing, Progressing     Problem: Hyperemesis Gravidarum  Goal: Nausea and Vomiting Relief  Outcome: Ongoing, Progressing  Intervention: Monitor and Manage Hypovolemia   Goal Outcome Evaluation:  Plan of Care Reviewed With: patient        Progress: improving  Outcome Evaluation: VSS. No emesis this shift. Continues on potassium protocol. Tolerating clear liquid diet and oral potassium. Urine is straw-colored now vs dark and output is moderate. Pt slept entire night. Hoping to advance to solids today

## 2023-04-16 VITALS
OXYGEN SATURATION: 99 % | BODY MASS INDEX: 21.63 KG/M2 | HEART RATE: 97 BPM | TEMPERATURE: 98.1 F | HEIGHT: 67 IN | RESPIRATION RATE: 18 BRPM | SYSTOLIC BLOOD PRESSURE: 96 MMHG | DIASTOLIC BLOOD PRESSURE: 51 MMHG | WEIGHT: 137.8 LBS

## 2023-04-16 PROCEDURE — 99239 HOSP IP/OBS DSCHRG MGMT >30: CPT | Performed by: OBSTETRICS & GYNECOLOGY

## 2023-04-16 RX ORDER — DOXYLAMINE SUCCINATE AND PYRIDOXINE HYDROCHLORIDE 20; 20 MG/1; MG/1
1 TABLET, EXTENDED RELEASE ORAL 2 TIMES DAILY
Qty: 60 TABLET | Refills: 2 | Status: SHIPPED | OUTPATIENT
Start: 2023-04-16 | End: 2023-05-16

## 2023-04-16 RX ORDER — DOXYLAMINE SUCCINATE AND PYRIDOXINE HYDROCHLORIDE 20; 20 MG/1; MG/1
1 TABLET, EXTENDED RELEASE ORAL 2 TIMES DAILY
Qty: 60 TABLET | Refills: 2 | Status: SHIPPED | OUTPATIENT
Start: 2023-04-16 | End: 2023-04-16 | Stop reason: SDUPTHER

## 2023-04-16 RX ORDER — PANTOPRAZOLE SODIUM 40 MG/1
40 TABLET, DELAYED RELEASE ORAL
Qty: 30 TABLET | Refills: 5 | Status: SHIPPED | OUTPATIENT
Start: 2023-04-17 | End: 2023-05-17

## 2023-04-16 RX ORDER — ONDANSETRON 4 MG/1
4 TABLET, ORALLY DISINTEGRATING ORAL EVERY 8 HOURS PRN
Qty: 15 TABLET | Refills: 2 | Status: SHIPPED | OUTPATIENT
Start: 2023-04-16

## 2023-04-16 RX ADMIN — DOXYLAMINE SUCCINATE AND PYRIDOXINE HYDROCHLORIDE, DELAYED RELEASE TABLETS 10 MG/10 MG 1 TABLET: 10; 10 TABLET, DELAYED RELEASE ORAL at 08:39

## 2023-04-16 RX ADMIN — POTASSIUM CHLORIDE, DEXTROSE MONOHYDRATE AND SODIUM CHLORIDE 125 ML/HR: 150; 5; 900 INJECTION, SOLUTION INTRAVENOUS at 02:38

## 2023-04-16 RX ADMIN — PANTOPRAZOLE SODIUM 40 MG: 40 TABLET, DELAYED RELEASE ORAL at 06:53

## 2023-04-16 RX ADMIN — FAMOTIDINE 20 MG: 10 INJECTION INTRAVENOUS at 08:39

## 2023-04-16 NOTE — DISCHARGE SUMMARY
[facility]         DISCHARGE SUMMARY    Patient Name: Bandar Rodriges  : 2000  MRN: [id number]    Date of Admission: 2023  Date of Discharge:  23  Primary Care Physician: Elle Coleman MD    Consults     No orders found from 3/15/2023 to 2023.          Presenting Problem:   Pregnancy [Z34.90]  Pregnant [Z34.90]    Active and Resolved Hospital Problems:  Active Hospital Problems    Diagnosis POA   • **Hyperemesis gravidarum with metabolic disturbance, antepartum [O21.1] Yes   • Pregnant [Z34.90] Not Applicable   • Severe Malnutrition (HCC) [E43] Yes   • Hypokalemia due to excessive gastrointestinal loss of potassium [E87.6] Yes      Resolved Hospital Problems    Diagnosis POA   • Pregnancy [Z34.90] Not Applicable         Hospital Course     Hospital Course:  Bandar Rodriges is a 23 y.o. female who was sent to the hospital from the office for persistent nausea and vomiting with signs of dehydration and weight loss.  Patient received IV fluid resuscitation, correction of her hypokalemia, rehydration, and diet was gradually increased.  Over the course of 3 days she was eventually able to tolerate a regular diet without any further episodes of emesis.  Discharge instruction given to the patient verbalized understanding.  She should follow-up in the office in 2 weeks.        Day of Discharge     Vital Signs:  Temp:  [98.1 °F (36.7 °C)-98.3 °F (36.8 °C)] 98.1 °F (36.7 °C)  Heart Rate:  [85-97] 97  Resp:  [16-18] 18  BP: (92-96)/(50-51) 96/51  Physical Exam:      Pertinent  and/or Most Recent Results     LAB RESULTS:      Lab 23  1611   WBC 12.89*   HEMOGLOBIN 16.2*   HEMATOCRIT 44.6   PLATELETS 313   NEUTROS ABS 9.46*   IMMATURE GRANS (ABS) 0.05   LYMPHS ABS 2.23   MONOS ABS 1.08*   EOS ABS 0.04   MCV 82.3         Lab 04/15/23  0547 23  1351 23  1842   SODIUM 140  --  133*   POTASSIUM 4.1  4.1 2.6* 2.8*   CHLORIDE 108*  --  90*   CO2 26.4  --  27.6   ANION GAP  5.6  --  15.4*   BUN 4*  --  13   CREATININE 0.53*  --  0.61   EGFR 134.3  --  129.8   GLUCOSE 85  --  89   CALCIUM 8.5*  --  10.1   MAGNESIUM  --  2.4  --          Lab 04/15/23  0547 04/13/23  1842   TOTAL PROTEIN 4.9* 7.5   ALBUMIN 3.4* 5.0   GLOBULIN 1.5 2.5   ALT (SGPT) 22 30   AST (SGOT) 13 19   BILIRUBIN 0.5 1.1   ALK PHOS 42 65                     Brief Urine Lab Results  (Last result in the past 365 days)      Color   Clarity   Blood   Leuk Est   Nitrite   Protein   CREAT   Urine HCG        04/14/23 2348 Yellow   Cloudy   Negative   Moderate (2+)   Negative   Negative               Microbiology Results (last 10 days)     ** No results found for the last 240 hours. **                         Labs Pending at Discharge: NA        Discharge Details        Discharge Medications      New Medications      Instructions Start Date   Bonjesta 20-20 MG tablet controlled-release  Generic drug: Doxylamine-Pyridoxine ER   1 tablet, Oral, 2 Times Daily      ondansetron ODT 4 MG disintegrating tablet  Commonly known as: ZOFRAN-ODT   Place 1 tablet on the tongue Every 8 (Eight) Hours As Needed for Nausea or Vomiting.      pantoprazole 40 MG EC tablet  Commonly known as: PROTONIX   40 mg, Oral, Every Morning Before Breakfast   Start Date: April 17, 2023        Continue These Medications      Instructions Start Date   promethazine 12.5 MG tablet  Commonly known as: PHENERGAN   12.5 mg, Oral, Every 6 Hours PRN             No Known Allergies      Discharge Disposition:  Home or Self Care    Diet:  Hospital:  Diet Order   Procedures   • Diet: Regular/House Diet; Texture: Regular Texture (IDDSI 7); Fluid Consistency: Thin (IDDSI 0)         Discharge Activity:   Activity as tolerated      CODE STATUS:  Code Status and Medical Interventions:   Ordered at: 04/14/23 1343     Code Status (Patient has no pulse and is not breathing):    CPR (Attempt to Resuscitate)     Medical Interventions (Patient has pulse or is breathing):    Full          No future appointments.    Additional Instructions for the Follow-ups that You Need to Schedule     Call MD With Problems / Concerns   As directed      Instructions: Call for fever greater than 101, persistent nausea and vomiting despite medications, severe abdominal pain, or any other major medical concerns    Order Comments: Instructions: Call for fever greater than 101, persistent nausea and vomiting despite medications, severe abdominal pain, or any other major medical concerns          Discharge Follow-up with Specified Provider: Dr. Montaño; 2 Weeks   As directed      To: Dr. Montaño    Follow Up: 2 Weeks               Time spent on Discharge including face to face service:  Greater than 30 minutes    William Curtis MD

## 2023-04-16 NOTE — PLAN OF CARE
Problem: Adult Inpatient Plan of Care  Goal: Plan of Care Review  Outcome: Ongoing, Progressing  Flowsheets  Taken 4/16/2023 1700 by Delmy Hatfield, RN  Outcome Evaluation: Patient discharged home today  Taken 4/16/2023 0645 by Britany Yusuf, RN  Plan of Care Reviewed With: patient   Goal Outcome Evaluation:              Outcome Evaluation: Patient discharged home today

## 2023-04-16 NOTE — PROGRESS NOTES
[facility]     Progress Note    Patient Name: Bandar Rodriges  : 2000  MRN: [id number]  Primary Care Physician:  Elle Coleman MD  Date of admission: 2023    Subjective   Subjective     Chief Complaint: Nausea and vomiting    HPI:  Patient Reports resolved nausea and vomiting at this time.  She was able to eat multiple meals yesterday and again today.  She denies any nausea at this time.  She has not had any vomiting in over 24 hours.  She states that she feels ready for discharge home at this time.      Objective   Objective     Vitals:   Temp:  [98.1 °F (36.7 °C)-98.3 °F (36.8 °C)] 98.1 °F (36.7 °C)  Heart Rate:  [85-97] 97  Resp:  [16-18] 18  BP: (92-96)/(50-51) 96/51  Physical Exam     General: No acute distress, awake and oriented x3  Psychiatric: good judgment and insight, normal mood  Neurological: cranial nerves II through XII intact, no deficits      Result Review    Result Review:  I have personally reviewed the results from the time of this admission to 2023 13:54 EDT and agree with these findings:  [x]  Laboratory list / accordion  []  Microbiology  []  Radiology  []  EKG/Telemetry   []  Cardiology/Vascular   []  Pathology  []  Old records  []  Other:  Most notable findings include:     Lab Results   Component Value Date    WBC 12.89 (H) 2023    HGB 16.2 (H) 2023    HCT 44.6 2023    MCV 82.3 2023     2023     Lab Results   Component Value Date     04/15/2023    K 4.1 04/15/2023    K 4.1 04/15/2023     (H) 04/15/2023    CO2 26.4 04/15/2023     Component  Ref Range & Units 2 d ago  (23) 3 d ago  (23) 3 d ago  (23) 3 wk ago  (3/20/23) 1 mo ago  (23) 2 yr ago  (20) 2 yr ago  (20)   Color, UA  Yellow, Straw Yellow  Dark Yellow Abnormal          Appearance, UA  Clear Cloudy Abnormal   Turbid Abnormal          pH, UA  5.0 - 8.0 7.5  5.5         Specific Gravity, UA  1.005 - 1.030 <1.005 Low   >=1.030          Glucose, UA  Negative Negative  Negative  Negative  Negative  Negative  Negative R  Negative R    Ketones, UA  Negative Negative  80 mg/dL (3+) Abnormal          Bilirubin, UA  Negative Negative  Negative         Blood, UA  Negative Negative  Negative         Protein, UA  Negative Negative  100 mg/dL (2+) Abnormal   3+ Abnormal   2+ Abnormal   Negative  Trace Abnormal   Negative R    Leuk Esterase, UA  Negative Moderate (2+) Abnormal   Negative         Nitrite, UA  Negative Negative  Negative           Component  Ref Range & Units 2 d ago  (4/14/23) 3 d ago  (4/13/23)   RBC, UA  None Seen, 0-2 /HPF 0-2  0-2    WBC, UA  None Seen, 0-2 /HPF 13-20 Abnormal   3-5 Abnormal     Bacteria, UA  None Seen /HPF 1+ Abnormal   None Seen    Squamous Epithelial Cells, UA  None Seen, 0-2 /HPF 7-12 Abnormal   3-6 Abnormal     Hyaline Casts, UA  None Seen /LPF None Seen  21-30          Assessment & Plan   Assessment / Plan     Brief Patient Summary:  Bandar Rodriges is a 23 y.o. female with hyperemesis gravidarum, improved  2.  Dehydration, resolved  3.  Hypokalemia, resolved    Active Hospital Problems:  Active Hospital Problems    Diagnosis    • **Hyperemesis gravidarum with metabolic disturbance, antepartum    • Pregnant    • Severe Malnutrition (HCC)    • Hypokalemia due to excessive gastrointestinal loss of potassium      Plan:    1.  Patient is doing much better at this time and appears ready for discharge.  Her potassium is back to normal.  Kidney area has resolved.  She has tolerated regular diet for over 24 hours without any emesis.  Patient is stable for discharge.  Extensive discharge instructions given to the patient.  We will have her continue doxylamine/vitamin B6 at home.  Follow-up in the office in 2 weeks    I spent greater than 30 minutes on the floor in the care of this patient (reviewing the chart, review of any pertinent imaging and labs, consult other physicians, direct patient care and counseling) excluding  any time spent on other separate billable services such as performing procedures or test interpretation, if applicable.        DVT prophylaxis:  Mechanical DVT prophylaxis orders are present.    CODE STATUS:   Code Status (Patient has no pulse and is not breathing): CPR (Attempt to Resuscitate)  Medical Interventions (Patient has pulse or is breathing): Full    Disposition:  I expect patient to be discharged today.    William Curtis MD

## 2023-04-16 NOTE — PLAN OF CARE
Problem: Adult Inpatient Plan of Care  Goal: Plan of Care Review  Outcome: Ongoing, Progressing  Goal: Patient-Specific Goal (Individualized)  Outcome: Ongoing, Progressing  Goal: Absence of Hospital-Acquired Illness or Injury  Outcome: Ongoing, Progressing  Intervention: Identify and Manage Fall Risk  Intervention: Prevent and Manage VTE (Venous Thromboembolism) Risk  Goal: Optimal Comfort and Wellbeing  Outcome: Ongoing, Progressing  Intervention: Provide Person-Centered Care  Goal: Readiness for Transition of Care  Outcome: Ongoing, Progressing     Problem: Hyperemesis Gravidarum  Goal: Nausea and Vomiting Relief  Outcome: Ongoing, Progressing  Intervention: Monitor and Manage Hypovolemia  Intervention: Promote and Optimize Nutrition Delivery   Goal Outcome Evaluation:  Plan of Care Reviewed With: patient        Progress: improving  Outcome Evaluation: VSS. No emesis this shift. No requirements for prn antiemetics. Pt eating solids throughout night without emesis. States that yesterday she ate several regular meals to include chicken, mashed potatoes, green beans. Also eating robles crackers with jelly and peanut butter, toast and butter. Pt voiding clear urine throughout night. Pt encouraged to ambulate to kitchen for snacks vs requesting RN to bring it to bedside. Pt slept throughout night.

## 2023-04-18 NOTE — PAYOR COMM NOTE
"Kristopher Julien \"Rabia\" (23 y.o. Female)     [facility]    [address] [place] [address]  Facility NPI: [id number]    Harman Almeida  Fax: [phone]  Phone: [phone] (Ludy: [phone])    Subject: ADMISSION NOTIFICATION AUTH CLINICALS re Faxing NON DELIVERY   Reference #: [id number]  Please don't hesitate to contact me with any questions or concerns.      Date of Birth   2000    Social Security Number       Address   [address] [address]    Home Phone   [phone]    MRN   [id number]       Scientology   None    Marital Status   Single                            Admission Date   4/13/23    Admission Type   Elective    Admitting Provider   Edwin Montaño MD    Attending Provider       Department, Room/Bed   [facility] ANTEPARTUM UNIT, 3103/1       Discharge Date   4/16/2023    Discharge Disposition   Home or Self Care    Discharge Destination                               Attending Provider: (none)   Allergies: No Known Allergies    Isolation: None   Infection: None   Code Status: Prior    Ht: 170.2 cm (67\")   Wt: 62.5 kg (137 lb 12.8 oz)    Admission Cmt: None   Principal Problem: Hyperemesis gravidarum with metabolic disturbance, antepartum [O21.1]                 Active Insurance as of 4/13/2023     Primary Coverage     Payor Plan Insurance Group Employer/Plan Group    [email or web address] BY [employer] BY DIOGENES [id number]     Payor Plan Address Payor Plan Phone Number Payor Plan Fax Number Effective Dates    [address] [address]   1/1/2021 - None Entered    [facility] [address]       Subscriber Name Subscriber Birth Date Member ID       KRISTOPHER JULIEN 2000 [id number]                 Emergency Contacts      (Rel.) Home Phone Work Phone Mobile Phone    Shraddha Rojasnda (Mother) -- -- [phone]    VICTORIA SPARKS (Significant Other) -- -- --            Insurance Information                [facility] BY " DIOGENES/P[employer] BY DIOGENES Phone: --    Subscriber: Bandar Rodriges Subscriber#: [id number]    Group#: [id number] Precert#: --          Problem List           Codes Noted - Resolved       Hospital    Pregnant ICD-10-CM: Z34.90  ICD-9-CM: V22.2 4/15/2023 - Present    Severe Malnutrition (HCC) ICD-10-CM: E43  ICD-9-CM: 261 4/15/2023 - Present    * (Principal) Hyperemesis gravidarum with metabolic disturbance, antepartum ICD-10-CM: O21.1  ICD-9-CM: 643.13 2023 - Present    Hypokalemia due to excessive gastrointestinal loss of potassium ICD-10-CM: E87.6  ICD-9-CM: 276.8 2023 - Present       Non-Hospital    Term pregnancy ICD-10-CM: Z34.90  ICD-9-CM: V22.1 2020 - Present        History & Physical      Edwin Montaño MD at 23 1532          Patient is a 23 y.o. female  at 11+ weeks admitted for hyperemesis gravidarum.  Patient with ongoing nausea/vomiting that has not been amenable to outpatient treatment.  She has lost nearly 30 lbs since beginning of pregnancy.    Patient Active Problem List   Diagnosis   • Term pregnancy     Prenatal care is complicated by hyperemesis, subchorionic hematoma in first trimester.    Past Medical History:   Diagnosis Date   • Urogenital trichomoniasis        Past Surgical History:   Procedure Laterality Date   • WISDOM TOOTH EXTRACTION         No Known Allergies    Social History     Socioeconomic History   • Marital status: Single   Tobacco Use   • Smoking status: Never   • Smokeless tobacco: Never   Vaping Use   • Vaping Use: Former   • Substances: Nicotine   Substance and Sexual Activity   • Alcohol use: Not Currently   • Drug use: Yes     Types: Marijuana   • Sexual activity: Yes     Partners: Male     Birth control/protection: None       Physical Exam  Gen: ill appearing  Vitals:    23 1515   BP: 114/75   Pulse: 113   Resp: 18   Temp: 97.7 °F (36.5 °C)   SpO2: 99%     HEENT: WNL  Abdomen: No guarding or rebound.      Assessment/Plan:  Hyperemesis Gravidarum in 1st trimester  - Labs ordered  - Antiemetics, IVF and strict NPO.        @[facility]@    Electronically signed by Edwin Montaño MD at 04/13/23 1538         No current facility-administered medications on file as of 4/16/2023.     Lab Results (last 72 hours)     Procedure Component Value Units Date/Time    Potassium [[id number]]  (Normal) Collected: 04/15/23 0547    Specimen: Blood Updated: 04/15/23 0638     Potassium 4.1 mmol/L     Comprehensive Metabolic Panel [[id number]]  (Abnormal) Collected: 04/15/23 0547    Specimen: Blood Updated: 04/15/23 0638     Glucose 85 mg/dL      BUN 4 mg/dL      Creatinine 0.53 mg/dL      Sodium 140 mmol/L      Potassium 4.1 mmol/L      Chloride 108 mmol/L      CO2 26.4 mmol/L      Calcium 8.5 mg/dL      Total Protein 4.9 g/dL      Albumin 3.4 g/dL      ALT (SGPT) 22 U/L      AST (SGOT) 13 U/L      Alkaline Phosphatase 42 U/L      Total Bilirubin 0.5 mg/dL      Globulin 1.5 gm/dL      A/G Ratio 2.3 g/dL      BUN/Creatinine Ratio 7.5     Anion Gap 5.6 mmol/L      eGFR 134.3 mL/min/1.73     Narrative:      GFR Normal >60  Chronic Kidney Disease <60  Kidney Failure <15      Urinalysis, Microscopic Only - Urine, Clean Catch [[id number]]  (Abnormal) Collected: 04/14/23 2348    Specimen: Urine, Clean Catch Updated: 04/15/23 0248     RBC, UA 0-2 /HPF      WBC, UA 13-20 /HPF      Bacteria, UA 1+ /HPF      Squamous Epithelial Cells, UA 7-12 /HPF      Hyaline Casts, UA None Seen /LPF      Methodology Manual Light Microscopy    Urinalysis With Microscopic If Indicated (No Culture) - Urine, Clean Catch [[id number]]  (Abnormal) Collected: 04/14/23 2348    Specimen: Urine, Clean Catch Updated: 04/15/23 0235     Color, UA Yellow     Appearance, UA Cloudy     pH, UA 7.5     Specific Gravity, UA <1.005     Glucose, UA Negative     Ketones, UA Negative     Bilirubin, UA Negative     Blood, UA Negative     Protein, UA Negative     Leuk Esterase, UA Moderate (2+)     Nitrite,  UA Negative     Urobilinogen, UA 1.0 E.U./dL    Magnesium [[id number]]  (Normal) Collected: 04/14/23 1351    Specimen: Blood Updated: 04/14/23 2049     Magnesium 2.4 mg/dL     Urinalysis With Microscopic If Indicated (No Culture) - Urine, Clean Catch [[id number]]  (Abnormal) Collected: 04/13/23 2255    Specimen: Urine, Clean Catch Updated: 04/14/23 1832     Color, UA Dark Yellow     Appearance, UA Turbid     pH, UA 5.5     Specific Gravity, UA >=1.030     Glucose, UA Negative     Ketones, UA 80 mg/dL (3+)     Bilirubin, UA Negative     Blood, UA Negative     Protein,  mg/dL (2+)     Leuk Esterase, UA Negative     Nitrite, UA Negative     Urobilinogen, UA 1.0 E.U./dL    Urinalysis, Microscopic Only - Urine, Clean Catch [[id number]]  (Abnormal) Collected: 04/13/23 2255    Specimen: Urine, Clean Catch Updated: 04/14/23 1832     RBC, UA 0-2 /HPF      WBC, UA 3-5 /HPF      Bacteria, UA None Seen /HPF      Squamous Epithelial Cells, UA 3-6 /HPF      Hyaline Casts, UA 21-30 /LPF      Methodology Automated Microscopy    Potassium [[id number]]  (Abnormal) Collected: 04/14/23 1351    Specimen: Blood Updated: 04/14/23 1444     Potassium 2.6 mmol/L     Urine Drug Screen - Urine, Clean Catch [[id number]]  (Abnormal) Collected: 04/13/23 2255    Specimen: Urine, Clean Catch Updated: 04/14/23 0109     Amphet/Methamphet, Screen Negative     Barbiturates Screen, Urine Negative     Benzodiazepine Screen, Urine Negative     Cocaine Screen, Urine Negative     Opiate Screen Negative     THC, Screen, Urine Positive     Methadone Screen, Urine Negative     Oxycodone Screen, Urine Negative    Narrative:      Negative Thresholds Per Drugs Screened:    Amphetamines                 500 ng/ml  Barbiturates                 200 ng/ml  Benzodiazepines              100 ng/ml  Cocaine                      300 ng/ml  Methadone                    300 ng/ml  Opiates                      300 ng/ml  Oxycodone                    100 ng/ml  THC                            50 ng/ml    The Normal Value for all drugs tested is negative. This report includes final unconfirmed screening results to be used for medical treatment purposes only. Unconfirmed results must not be used for non-medical purposes such as employment or legal testing. Clinical consideration should be applied to any drug of abuse test, particularly when unconfirmed results are used.            Comprehensive Metabolic Panel [[id number]]  (Abnormal) Collected: 04/13/23 1842    Specimen: Blood Updated: 04/13/23 1923     Glucose 89 mg/dL      BUN 13 mg/dL      Creatinine 0.61 mg/dL      Sodium 133 mmol/L      Potassium 2.8 mmol/L      Chloride 90 mmol/L      CO2 27.6 mmol/L      Calcium 10.1 mg/dL      Total Protein 7.5 g/dL      Albumin 5.0 g/dL      ALT (SGPT) 30 U/L      AST (SGOT) 19 U/L      Alkaline Phosphatase 65 U/L      Total Bilirubin 1.1 mg/dL      Globulin 2.5 gm/dL      A/G Ratio 2.0 g/dL      BUN/Creatinine Ratio 21.3     Anion Gap 15.4 mmol/L      eGFR 129.8 mL/min/1.73     Narrative:      GFR Normal >60  Chronic Kidney Disease <60  Kidney Failure <15            Imaging Results (Last 72 Hours)     ** No results found for the last 72 hours. **        ECG/EMG Results (last 72 hours)     ** No results found for the last 72 hours. **        Orders (last 72 hrs)      Start     Ordered    04/17/23 0000  pantoprazole (PROTONIX) 40 MG EC tablet  Every Morning Before Breakfast         04/16/23 1354    04/16/23 1354  Discontinue IV  Once,   Status:  Canceled         04/16/23 1354    04/16/23 1351  Discharge patient  Once         04/16/23 1354    04/16/23 0000  Doxylamine-Pyridoxine ER (Bonjesta) 20-20 MG tablet controlled-release  2 Times Daily,   Status:  Discontinued         04/16/23 1354    04/16/23 0000  ondansetron ODT (ZOFRAN-ODT) 4 MG disintegrating tablet  Every 8 Hours PRN         04/16/23 1354    04/16/23 0000  Discharge Follow-up with Specified Provider: Dr. Montaño; 2 Weeks         04/16/23  1354    04/16/23 0000  Diet: Regular/House Diet; Regular Texture (IDDSI 7); Thin (IDDSI 0)         04/16/23 1354    04/16/23 0000  Call MD With Problems / Concerns        Comments: Instructions: Call for fever greater than 101, persistent nausea and vomiting despite medications, severe abdominal pain, or any other major medical concerns    04/16/23 1354    04/16/23 0000  Doxylamine-Pyridoxine ER (Bonjesta) 20-20 MG tablet controlled-release  2 Times Daily,   Status:  Discontinued         04/16/23 1526    04/16/23 0000  Doxylamine-Pyridoxine ER (Bonjesta) 20-20 MG tablet controlled-release  2 Times Daily        Note to Pharmacy: Replaces previous order    04/16/23 1544    04/15/23 1537  DIET MESSAGE Dinner 4/15- Choice one- pork, with chocolate cake with icing and tea to drink  Once,   Status:  Canceled        Comments: Dinner 4/15- Choice one- pork, with chocolate cake with icing and tea to drink    04/15/23 1537    04/15/23 1015  Diet: Regular/House Diet; Texture: Regular Texture (IDDSI 7); Fluid Consistency: Thin (IDDSI 0)  Diet Effective Now,   Status:  Canceled         04/15/23 1015    04/15/23 0600  Urinalysis With Microscopic If Indicated (No Culture) - Urine, Clean Catch  Daily,   Status:  Canceled       04/15/23 0214    04/14/23 1853  Patient Currently On Electrolyte Replacement Protocol - Please Refer to MAR for Protocol Details  Misc Nursing Order (Specify)  Daily,   Status:  Canceled      Comments: Patient Currently On Electrolyte Replacement Protocol - Please Refer to MAR for Protocol Details    04/14/23 1852 04/14/23 1851  potassium chloride (K-DUR,KLOR-CON) ER tablet 40 mEq  As Needed,   Status:  Discontinued        See Hyperspace for full Linked Orders Report.    04/14/23 1852 04/14/23 1851  potassium chloride (KLOR-CON) packet 40 mEq  As Needed,   Status:  Discontinued        See Hyperspace for full Linked Orders Report.    04/14/23 1852 04/14/23 1851  potassium chloride 10 mEq in 100 mL  IVPB  Every 1 Hour PRN,   Status:  Discontinued        See Hyperspace for full Linked Orders Report.    04/14/23 1852    04/14/23 1800  Dietary Nutrition Supplements Boost Plus (Ensure Enlive, Ensure Plus); strawberry  Daily With Breakfast & Dinner,   Status:  Canceled      Comments: Once diet advanced    04/14/23 1521    04/14/23 1600  doxylamine-pyridoxine (DICLEGIS) EC tablet 1 tablet  3 Times Daily,   Status:  Discontinued         04/14/23 1342    04/14/23 1430  thiamine (B-1) 100 mg in sodium chloride 0.9 % 100 mL IVPB  Daily,   Status:  Discontinued         04/14/23 1342    04/14/23 1430  pantoprazole (PROTONIX) EC tablet 40 mg  Every Morning Before Breakfast,   Status:  Discontinued         04/14/23 1342    04/14/23 1430  dextrose 5 % and sodium chloride 0.9 % with KCl 20 mEq/L infusion  Continuous,   Status:  Discontinued         04/14/23 1342    04/14/23 1343  Daily Weights  Daily,   Status:  Canceled       04/14/23 1342    04/13/23 1927  Patient Currently On Electrolyte Replacement Protocol - Please Refer to MAR for Protocol Details  Misc Nursing Order (Specify)  Daily,   Status:  Canceled      Comments: Patient Currently On Electrolyte Replacement Protocol - Please Refer to MAR for Protocol Details    04/13/23 1926    04/13/23 1925  Initiate & Follow Electrolyte Replacement Protocol  As Needed,   Status:  Canceled       04/13/23 1926 04/13/23 1925  Magnesium  As Needed,   Status:  Canceled       04/13/23 1926    04/13/23 1715  famotidine (PEPCID) injection 20 mg  Every 12 Hours Scheduled,   Status:  Discontinued         04/13/23 1615    04/13/23 1536  acetaminophen (TYLENOL) tablet 650 mg  Every 6 Hours PRN,   Status:  Discontinued         04/13/23 1536    04/13/23 1532  ondansetron (ZOFRAN) injection 4 mg  Every 6 Hours PRN,   Status:  Discontinued         04/13/23 1532    Signed and Held  Vital Signs Per Hospital Policy  Per Hospital Policy         Signed and Held    Signed and Held  For Antepartum  Patients Less Than 24 Weeks - Document Fetal Heart Tones Daily and PRN.  Per Order Details        Comments: For Antepartum Patients Less Than 24 Weeks - Document Fetal Heart Tones Daily & PRN.    Signed and Held    Signed and Held  Notify Provider (Specified)  Until Discontinued         Signed and Held    Signed and Held  Notify Provider of Tachysystole (Per Hospital Algorithm)  Until Discontinued         Signed and Held    Signed and Held  Notify Provider if Membranes Ruptured, Bleeding Greater Than 1 Pad Per Hour, Fetal Heart Tone Abnormality or Severe Pain  Until Discontinued         Signed and Held    Signed and Held  lidocaine PF 1% (XYLOCAINE) injection 5 mL  As Needed         Signed and Held    Signed and Held  Insert Peripheral IV  Once         Signed and Held    Signed and Held  Saline Lock & Maintain IV Access  Continuous         Signed and Held    Signed and Held  sodium chloride 0.9 % flush 10 mL  Every 12 Hours Scheduled         Signed and Held    Signed and Held  sodium chloride 0.9 % flush 10 mL  As Needed         Signed and Held                Operative/Procedure Notes (last 72 hours)  Notes from 04/15/23 0938 through 23 0938   No notes of this type exist for this encounter.            Physician Progress Notes (last 72 hours)      William Curtis MD at 23 1354           [facility]     Progress Note    Patient Name: Bandar Rodriges  : 2000  MRN: [id number]  Primary Care Physician:  Elle Coleman MD  Date of admission: 2023    Subjective   Subjective     Chief Complaint: Nausea and vomiting    HPI:  Patient Reports resolved nausea and vomiting at this time.  She was able to eat multiple meals yesterday and again today.  She denies any nausea at this time.  She has not had any vomiting in over 24 hours.  She states that she feels ready for discharge home at this time.      Objective   Objective     Vitals:   Temp:  [98.1 °F (36.7 °C)-98.3 °F (36.8 °C)] 98.1  °F (36.7 °C)  Heart Rate:  [85-97] 97  Resp:  [16-18] 18  BP: (92-96)/(50-51) 96/51  Physical Exam     General: No acute distress, awake and oriented x3  Psychiatric: good judgment and insight, normal mood  Neurological: cranial nerves II through XII intact, no deficits      Result Review    Result Review:  I have personally reviewed the results from the time of this admission to 4/16/2023 13:54 EDT and agree with these findings:  [x]  Laboratory list / accordion  []  Microbiology  []  Radiology  []  EKG/Telemetry   []  Cardiology/Vascular   []  Pathology  []  Old records  []  Other:  Most notable findings include:     Lab Results   Component Value Date    WBC 12.89 (H) 04/13/2023    HGB 16.2 (H) 04/13/2023    HCT 44.6 04/13/2023    MCV 82.3 04/13/2023     04/13/2023     Lab Results   Component Value Date     04/15/2023    K 4.1 04/15/2023    K 4.1 04/15/2023     (H) 04/15/2023    CO2 26.4 04/15/2023     Component  Ref Range & Units 2 d ago  (4/14/23) 3 d ago  (4/13/23) 3 d ago  (4/13/23) 3 wk ago  (3/20/23) 1 mo ago  (2/27/23) 2 yr ago  (11/24/20) 2 yr ago  (11/19/20)   Color, UA  Yellow, Straw Yellow  Dark Yellow Abnormal          Appearance, UA  Clear Cloudy Abnormal   Turbid Abnormal          pH, UA  5.0 - 8.0 7.5  5.5         Specific Gravity, UA  1.005 - 1.030 <1.005 Low   >=1.030         Glucose, UA  Negative Negative  Negative  Negative  Negative  Negative  Negative R  Negative R    Ketones, UA  Negative Negative  80 mg/dL (3+) Abnormal          Bilirubin, UA  Negative Negative  Negative         Blood, UA  Negative Negative  Negative         Protein, UA  Negative Negative  100 mg/dL (2+) Abnormal   3+ Abnormal   2+ Abnormal   Negative  Trace Abnormal   Negative R    Leuk Esterase, UA  Negative Moderate (2+) Abnormal   Negative         Nitrite, UA  Negative Negative  Negative           Component  Ref Range & Units 2 d ago  (4/14/23) 3 d ago  (4/13/23)   RBC, UA  None Seen, 0-2 /HPF 0-2  0-2     WBC, UA  None Seen, 0-2 /HPF 13-20 Abnormal   3-5 Abnormal     Bacteria, UA  None Seen /HPF 1+ Abnormal   None Seen    Squamous Epithelial Cells, UA  None Seen, 0-2 /HPF 7-12 Abnormal   3-6 Abnormal     Hyaline Casts, UA  None Seen /LPF None Seen  21-30          Assessment & Plan   Assessment / Plan     Brief Patient Summary:  Bandar Rodriges is a 23 y.o. female with hyperemesis gravidarum, improved  2.  Dehydration, resolved  3.  Hypokalemia, resolved    Active Hospital Problems:  Active Hospital Problems    Diagnosis    • **Hyperemesis gravidarum with metabolic disturbance, antepartum    • Pregnant    • Severe Malnutrition (HCC)    • Hypokalemia due to excessive gastrointestinal loss of potassium      Plan:    1.  Patient is doing much better at this time and appears ready for discharge.  Her potassium is back to normal.  Kidney area has resolved.  She has tolerated regular diet for over 24 hours without any emesis.  Patient is stable for discharge.  Extensive discharge instructions given to the patient.  We will have her continue doxylamine/vitamin B6 at home.  Follow-up in the office in 2 weeks    I spent greater than 30 minutes on the floor in the care of this patient (reviewing the chart, review of any pertinent imaging and labs, consult other physicians, direct patient care and counseling) excluding any time spent on other separate billable services such as performing procedures or test interpretation, if applicable.        DVT prophylaxis:  Mechanical DVT prophylaxis orders are present.    CODE STATUS:   Code Status (Patient has no pulse and is not breathing): CPR (Attempt to Resuscitate)  Medical Interventions (Patient has pulse or is breathing): Full    Disposition:  I expect patient to be discharged today.    William Curtis MD    Electronically signed by William Curtis MD at 04/16/23 1358     Josiah Wang MD at 04/15/23 1011          Hospital Day #3      Patient name:  Bandar Rodriges  Age: 23 y.o.  Sex: female  YOB: 2000   MRN: [id number]  Admission Date: 4/13/2023    Gestational age: 11w5d    CC:   Hyperemesis gravidarum with metabolic disturbance, antepartum    Hypokalemia due to excessive gastrointestinal loss of potassium    Pregnant      Subjective:    Patient reports less nausea today and has not had any emesis since her admission.  She has tolerated small amounts of clear liquid.  She does desire to increase her diet today.      ROS: Negative for fever or chills.  Positive for nausea.  Objective:      Vitals:    04/15/23 0826   BP: 105/70   Pulse: 89   Resp: 18   Temp: 98.3 °F (36.8 °C)   SpO2: 100%       normal general appearance and in no apparent distress      Exam:     Abdomen: Soft, non tender, non distended.    Extremities: no fabiola    Labs:  Lab Results   Component Value Date    WBC 12.89 (H) 04/13/2023    HGB 16.2 (H) 04/13/2023    HCT 44.6 04/13/2023    MCV 82.3 04/13/2023     04/13/2023               Assessment:      11-week 5-day intrauterine gestation with hyperemesis gravidarum.  Hypokalemia has resolved and potassium 4.1 now.  Urine ketones negative last evening.    Plan:       Increased to regular diet today and plan for discharge home tomorrow if she tolerates all this.    .    Josiah Wang MD  4/15/2023  10:11 EDT      Electronically signed by Josiah Wang MD at 04/15/23 1014       Consult Notes (last 72 hours)  Notes from 04/15/23 0939 through 04/18/23 0939   No notes of this type exist for this encounter.

## 2023-04-19 NOTE — PAYOR COMM NOTE
"Kristopher Julien \"Rabia\" (23 y.o. Female)     [facility]  [address] [place] [address]  Facility NPI: [id number]  Harman Almeida  Fax: [phone]  Phone: [phone] (Ludy: [phone])  Subject: ADMISSION NOTIFICATION AUTH CLINICALS 3RD FAX, OBS TO INP CONVERSION CALLED AND WAS TOLD TO FAX CLINICALS AGAIN  Reference #: [id number]  Please don't hesitate to contact me with any questions or concerns.        Date of Birth   2000    Social Security Number       Address   [address] [address]    Home Phone   [phone]    MRN   [id number]       Christian   None    Marital Status   Single                            Admission Date   4/13/23    Admission Type   Elective    Admitting Provider   Edwin Montaño MD    Attending Provider       Department, Room/Bed   [facility] ANTEPARTUM UNIT, 3103/1       Discharge Date   4/16/2023    Discharge Disposition   Home or Self Care    Discharge Destination                               Attending Provider: (none)   Allergies: No Known Allergies    Isolation: None   Infection: None   Code Status: Prior    Ht: 170.2 cm (67\")   Wt: 62.5 kg (137 lb 12.8 oz)    Admission Cmt: None   Principal Problem: Hyperemesis gravidarum with metabolic disturbance, antepartum [O21.1]                 Active Insurance as of 4/13/2023     Primary Coverage     Payor Plan Insurance Group Employer/Plan Group    [facility] BY DIOGENES [facility] BY DIOGENES [id number]     Payor Plan Address Payor Plan Phone Number Payor Plan Fax Number Effective Dates    [address] [address]   1/1/2021 - None Entered    [facility] [address]       Subscriber Name Subscriber Birth Date Member ID       KRISTOPHER JULIEN 2000 [id number]                 Emergency Contacts      (Rel.) Home Phone Work Phone Mobile Phone    Jefferson Rojas (Mother) -- -- [phone]    VICTORIA SPARKS (Significant Other) -- -- --            Insurance " Information                [employer] Phone: --    Subscriber: Bandar Rodriges Subscriber#: [id number]    Group#: [id number] Precert#: --             History & Physical      Edwin Montaño MD at 23 3741          Patient is a 23 y.o. female  at 11+ weeks admitted for hyperemesis gravidarum.  Patient with ongoing nausea/vomiting that has not been amenable to outpatient treatment.  She has lost nearly 30 lbs since beginning of pregnancy.    Patient Active Problem List   Diagnosis   • Term pregnancy     Prenatal care is complicated by hyperemesis, subchorionic hematoma in first trimester.    Past Medical History:   Diagnosis Date   • Urogenital trichomoniasis        Past Surgical History:   Procedure Laterality Date   • WISDOM TOOTH EXTRACTION         No Known Allergies    Social History     Socioeconomic History   • Marital status: Single   Tobacco Use   • Smoking status: Never   • Smokeless tobacco: Never   Vaping Use   • Vaping Use: Former   • Substances: Nicotine   Substance and Sexual Activity   • Alcohol use: Not Currently   • Drug use: Yes     Types: Marijuana   • Sexual activity: Yes     Partners: Male     Birth control/protection: None       Physical Exam  Gen: ill appearing  Vitals:    23 1515   BP: 114/75   Pulse: 113   Resp: 18   Temp: 97.7 °F (36.5 °C)   SpO2: 99%     HEENT: WNL  Abdomen: No guarding or rebound.      Assessment/Plan: Hyperemesis Gravidarum in 1st trimester  - Labs ordered  - Antiemetics, IVF and strict NPO.        @[facility]@    Electronically signed by Edwin Montaño MD at 23 6409         No current facility-administered medications on file as of 2023.     Lab Results (last 72 hours)     Procedure Component Value Units Date/Time    Potassium [[id number]]  (Normal) Collected: 04/15/23 0547    Specimen: Blood Updated: 04/15/23 0638     Potassium 4.1 mmol/L     Comprehensive Metabolic Panel [[id number]]   (Abnormal) Collected: 04/15/23 0547    Specimen: Blood Updated: 04/15/23 0638     Glucose 85 mg/dL      BUN 4 mg/dL      Creatinine 0.53 mg/dL      Sodium 140 mmol/L      Potassium 4.1 mmol/L      Chloride 108 mmol/L      CO2 26.4 mmol/L      Calcium 8.5 mg/dL      Total Protein 4.9 g/dL      Albumin 3.4 g/dL      ALT (SGPT) 22 U/L      AST (SGOT) 13 U/L      Alkaline Phosphatase 42 U/L      Total Bilirubin 0.5 mg/dL      Globulin 1.5 gm/dL      A/G Ratio 2.3 g/dL      BUN/Creatinine Ratio 7.5     Anion Gap 5.6 mmol/L      eGFR 134.3 mL/min/1.73     Narrative:      GFR Normal >60  Chronic Kidney Disease <60  Kidney Failure <15      Urinalysis, Microscopic Only - Urine, Clean Catch [[id number]]  (Abnormal) Collected: 04/14/23 2348    Specimen: Urine, Clean Catch Updated: 04/15/23 0248     RBC, UA 0-2 /HPF      WBC, UA 13-20 /HPF      Bacteria, UA 1+ /HPF      Squamous Epithelial Cells, UA 7-12 /HPF      Hyaline Casts, UA None Seen /LPF      Methodology Manual Light Microscopy    Urinalysis With Microscopic If Indicated (No Culture) - Urine, Clean Catch [[id number]]  (Abnormal) Collected: 04/14/23 2348    Specimen: Urine, Clean Catch Updated: 04/15/23 0235     Color, UA Yellow     Appearance, UA Cloudy     pH, UA 7.5     Specific Gravity, UA <1.005     Glucose, UA Negative     Ketones, UA Negative     Bilirubin, UA Negative     Blood, UA Negative     Protein, UA Negative     Leuk Esterase, UA Moderate (2+)     Nitrite, UA Negative     Urobilinogen, UA 1.0 E.U./dL    Magnesium [[id number]]  (Normal) Collected: 04/14/23 1351    Specimen: Blood Updated: 04/14/23 2049     Magnesium 2.4 mg/dL     Urinalysis With Microscopic If Indicated (No Culture) - Urine, Clean Catch [[id number]]  (Abnormal) Collected: 04/13/23 2255    Specimen: Urine, Clean Catch Updated: 04/14/23 1832     Color, UA Dark Yellow     Appearance, UA Turbid     pH, UA 5.5     Specific Gravity, UA >=1.030     Glucose, UA Negative     Ketones, UA 80 mg/dL (3+)      Bilirubin, UA Negative     Blood, UA Negative     Protein,  mg/dL (2+)     Leuk Esterase, UA Negative     Nitrite, UA Negative     Urobilinogen, UA 1.0 E.U./dL    Urinalysis, Microscopic Only - Urine, Clean Catch [[id number]]  (Abnormal) Collected: 04/13/23 2255    Specimen: Urine, Clean Catch Updated: 04/14/23 1832     RBC, UA 0-2 /HPF      WBC, UA 3-5 /HPF      Bacteria, UA None Seen /HPF      Squamous Epithelial Cells, UA 3-6 /HPF      Hyaline Casts, UA 21-30 /LPF      Methodology Automated Microscopy    Potassium [[id number]]  (Abnormal) Collected: 04/14/23 1351    Specimen: Blood Updated: 04/14/23 1444     Potassium 2.6 mmol/L     Urine Drug Screen - Urine, Clean Catch [[id number]]  (Abnormal) Collected: 04/13/23 2255    Specimen: Urine, Clean Catch Updated: 04/14/23 0109     Amphet/Methamphet, Screen Negative     Barbiturates Screen, Urine Negative     Benzodiazepine Screen, Urine Negative     Cocaine Screen, Urine Negative     Opiate Screen Negative     THC, Screen, Urine Positive     Methadone Screen, Urine Negative     Oxycodone Screen, Urine Negative    Narrative:      Negative Thresholds Per Drugs Screened:    Amphetamines                 500 ng/ml  Barbiturates                 200 ng/ml  Benzodiazepines              100 ng/ml  Cocaine                      300 ng/ml  Methadone                    300 ng/ml  Opiates                      300 ng/ml  Oxycodone                    100 ng/ml  THC                           50 ng/ml    The Normal Value for all drugs tested is negative. This report includes final unconfirmed screening results to be used for medical treatment purposes only. Unconfirmed results must not be used for non-medical purposes such as employment or legal testing. Clinical consideration should be applied to any drug of abuse test, particularly when unconfirmed results are used.            Comprehensive Metabolic Panel [[id number]]  (Abnormal) Collected: 04/13/23 1842    Specimen: Blood  Updated: 04/13/23 1923     Glucose 89 mg/dL      BUN 13 mg/dL      Creatinine 0.61 mg/dL      Sodium 133 mmol/L      Potassium 2.8 mmol/L      Chloride 90 mmol/L      CO2 27.6 mmol/L      Calcium 10.1 mg/dL      Total Protein 7.5 g/dL      Albumin 5.0 g/dL      ALT (SGPT) 30 U/L      AST (SGOT) 19 U/L      Alkaline Phosphatase 65 U/L      Total Bilirubin 1.1 mg/dL      Globulin 2.5 gm/dL      A/G Ratio 2.0 g/dL      BUN/Creatinine Ratio 21.3     Anion Gap 15.4 mmol/L      eGFR 129.8 mL/min/1.73     Narrative:      GFR Normal >60  Chronic Kidney Disease <60  Kidney Failure <15            Imaging Results (Last 72 Hours)     ** No results found for the last 72 hours. **        ECG/EMG Results (last 72 hours)     ** No results found for the last 72 hours. **        Orders (last 72 hrs)      Start     Ordered    04/17/23 0000  pantoprazole (PROTONIX) 40 MG EC tablet  Every Morning Before Breakfast         04/16/23 1354    04/16/23 1354  Discontinue IV  Once,   Status:  Canceled         04/16/23 1354    04/16/23 1351  Discharge patient  Once         04/16/23 1354    04/16/23 0000  Doxylamine-Pyridoxine ER (Bonjesta) 20-20 MG tablet controlled-release  2 Times Daily,   Status:  Discontinued         04/16/23 1354    04/16/23 0000  ondansetron ODT (ZOFRAN-ODT) 4 MG disintegrating tablet  Every 8 Hours PRN         04/16/23 1354    04/16/23 0000  Discharge Follow-up with Specified Provider: Dr. Montaño; 2 Weeks         04/16/23 1354    04/16/23 0000  Diet: Regular/House Diet; Regular Texture (IDDSI 7); Thin (IDDSI 0)         04/16/23 1354    04/16/23 0000  Call MD With Problems / Concerns        Comments: Instructions: Call for fever greater than 101, persistent nausea and vomiting despite medications, severe abdominal pain, or any other major medical concerns    04/16/23 1354    04/16/23 0000  Doxylamine-Pyridoxine ER (Bonjesta) 20-20 MG tablet controlled-release  2 Times Daily,   Status:  Discontinued         04/16/23 1526     04/16/23 0000  Doxylamine-Pyridoxine ER (Bonjesta) 20-20 MG tablet controlled-release  2 Times Daily        Note to Pharmacy: Replaces previous order    04/16/23 1544    04/14/23 1851  potassium chloride (K-DUR,KLOR-CON) ER tablet 40 mEq  As Needed,   Status:  Discontinued        See Hyperspace for full Linked Orders Report.    04/14/23 1852 04/14/23 1851  potassium chloride (KLOR-CON) packet 40 mEq  As Needed,   Status:  Discontinued        See Hyperspace for full Linked Orders Report.    04/14/23 1852 04/14/23 1851  potassium chloride 10 mEq in 100 mL IVPB  Every 1 Hour PRN,   Status:  Discontinued        See Hyperspace for full Linked Orders Report.    04/14/23 1852 04/14/23 1800  Dietary Nutrition Supplements Boost Plus (Ensure Enlive, Ensure Plus); strawberry  Daily With Breakfast & Dinner,   Status:  Canceled      Comments: Once diet advanced    04/14/23 1521    04/14/23 1600  doxylamine-pyridoxine (DICLEGIS) EC tablet 1 tablet  3 Times Daily,   Status:  Discontinued         04/14/23 1342    04/14/23 1430  thiamine (B-1) 100 mg in sodium chloride 0.9 % 100 mL IVPB  Daily,   Status:  Discontinued         04/14/23 1342 04/14/23 1430  pantoprazole (PROTONIX) EC tablet 40 mg  Every Morning Before Breakfast,   Status:  Discontinued         04/14/23 1342    04/14/23 1430  dextrose 5 % and sodium chloride 0.9 % with KCl 20 mEq/L infusion  Continuous,   Status:  Discontinued         04/14/23 1342    04/13/23 1925  Initiate & Follow Electrolyte Replacement Protocol  As Needed,   Status:  Canceled       04/13/23 1926    04/13/23 1925  Magnesium  As Needed,   Status:  Canceled       04/13/23 1926    04/13/23 1715  famotidine (PEPCID) injection 20 mg  Every 12 Hours Scheduled,   Status:  Discontinued         04/13/23 1615    04/13/23 1536  acetaminophen (TYLENOL) tablet 650 mg  Every 6 Hours PRN,   Status:  Discontinued         04/13/23 1536    04/13/23 1532  ondansetron (ZOFRAN) injection 4 mg  Every 6 Hours  PRN,   Status:  Discontinued         23 1532    Signed and Held  Vital Signs Per Hospital Policy  Per Hospital Policy         Signed and Held    Signed and Held  For Antepartum Patients Less Than 24 Weeks - Document Fetal Heart Tones Daily and PRN.  Per Order Details        Comments: For Antepartum Patients Less Than 24 Weeks - Document Fetal Heart Tones Daily & PRN.    Signed and Held    Signed and Held  Notify Provider (Specified)  Until Discontinued         Signed and Held    Signed and Held  Notify Provider of Tachysystole (Per Hospital Algorithm)  Until Discontinued         Signed and Held    Signed and Held  Notify Provider if Membranes Ruptured, Bleeding Greater Than 1 Pad Per Hour, Fetal Heart Tone Abnormality or Severe Pain  Until Discontinued         Signed and Held    Signed and Held  lidocaine PF 1% (XYLOCAINE) injection 5 mL  As Needed         Signed and Held    Signed and Held  Insert Peripheral IV  Once         Signed and Held    Signed and Held  Saline Lock & Maintain IV Access  Continuous         Signed and Held    Signed and Held  sodium chloride 0.9 % flush 10 mL  Every 12 Hours Scheduled         Signed and Held    Signed and Held  sodium chloride 0.9 % flush 10 mL  As Needed         Signed and Held                Operative/Procedure Notes (last 72 hours)  Notes from 23 1300 through 23 1300   No notes of this type exist for this encounter.            Physician Progress Notes (last 72 hours)      William Curtis MD at 23 1354           [facility]     Progress Note    Patient Name: Bandar Rodriges  : 2000  MRN: [id number]  Primary Care Physician:  Elle Coleman MD  Date of admission: 2023    Subjective   Subjective     Chief Complaint: Nausea and vomiting    HPI:  Patient Reports resolved nausea and vomiting at this time.  She was able to eat multiple meals yesterday and again today.  She denies any nausea at this time.  She has not had any  vomiting in over 24 hours.  She states that she feels ready for discharge home at this time.      Objective   Objective     Vitals:   Temp:  [98.1 °F (36.7 °C)-98.3 °F (36.8 °C)] 98.1 °F (36.7 °C)  Heart Rate:  [85-97] 97  Resp:  [16-18] 18  BP: (92-96)/(50-51) 96/51  Physical Exam     General: No acute distress, awake and oriented x3  Psychiatric: good judgment and insight, normal mood  Neurological: cranial nerves II through XII intact, no deficits      Result Review    Result Review:  I have personally reviewed the results from the time of this admission to 4/16/2023 13:54 EDT and agree with these findings:  [x]  Laboratory list / accordion  []  Microbiology  []  Radiology  []  EKG/Telemetry   []  Cardiology/Vascular   []  Pathology  []  Old records  []  Other:  Most notable findings include:     Lab Results   Component Value Date    WBC 12.89 (H) 04/13/2023    HGB 16.2 (H) 04/13/2023    HCT 44.6 04/13/2023    MCV 82.3 04/13/2023     04/13/2023     Lab Results   Component Value Date     04/15/2023    K 4.1 04/15/2023    K 4.1 04/15/2023     (H) 04/15/2023    CO2 26.4 04/15/2023     Component  Ref Range & Units 2 d ago  (4/14/23) 3 d ago  (4/13/23) 3 d ago  (4/13/23) 3 wk ago  (3/20/23) 1 mo ago  (2/27/23) 2 yr ago  (11/24/20) 2 yr ago  (11/19/20)   Color, UA  Yellow, Straw Yellow  Dark Yellow Abnormal          Appearance, UA  Clear Cloudy Abnormal   Turbid Abnormal          pH, UA  5.0 - 8.0 7.5  5.5         Specific Gravity, UA  1.005 - 1.030 <1.005 Low   >=1.030         Glucose, UA  Negative Negative  Negative  Negative  Negative  Negative  Negative R  Negative R    Ketones, UA  Negative Negative  80 mg/dL (3+) Abnormal          Bilirubin, UA  Negative Negative  Negative         Blood, UA  Negative Negative  Negative         Protein, UA  Negative Negative  100 mg/dL (2+) Abnormal   3+ Abnormal   2+ Abnormal   Negative  Trace Abnormal   Negative R    Leuk Esterase, UA  Negative Moderate  (2+) Abnormal   Negative         Nitrite, UA  Negative Negative  Negative           Component  Ref Range & Units 2 d ago  (4/14/23) 3 d ago  (4/13/23)   RBC, UA  None Seen, 0-2 /HPF 0-2  0-2    WBC, UA  None Seen, 0-2 /HPF 13-20 Abnormal   3-5 Abnormal     Bacteria, UA  None Seen /HPF 1+ Abnormal   None Seen    Squamous Epithelial Cells, UA  None Seen, 0-2 /HPF 7-12 Abnormal   3-6 Abnormal     Hyaline Casts, UA  None Seen /LPF None Seen  21-30          Assessment & Plan   Assessment / Plan     Brief Patient Summary:  Bandar Rodriges is a 23 y.o. female with hyperemesis gravidarum, improved  2.  Dehydration, resolved  3.  Hypokalemia, resolved    Active Hospital Problems:  Active Hospital Problems    Diagnosis    • **Hyperemesis gravidarum with metabolic disturbance, antepartum    • Pregnant    • Severe Malnutrition (HCC)    • Hypokalemia due to excessive gastrointestinal loss of potassium      Plan:    1.  Patient is doing much better at this time and appears ready for discharge.  Her potassium is back to normal.  Kidney area has resolved.  She has tolerated regular diet for over 24 hours without any emesis.  Patient is stable for discharge.  Extensive discharge instructions given to the patient.  We will have her continue doxylamine/vitamin B6 at home.  Follow-up in the office in 2 weeks    I spent greater than 30 minutes on the floor in the care of this patient (reviewing the chart, review of any pertinent imaging and labs, consult other physicians, direct patient care and counseling) excluding any time spent on other separate billable services such as performing procedures or test interpretation, if applicable.        DVT prophylaxis:  Mechanical DVT prophylaxis orders are present.    CODE STATUS:   Code Status (Patient has no pulse and is not breathing): CPR (Attempt to Resuscitate)  Medical Interventions (Patient has pulse or is breathing): Full    Disposition:  I expect patient to be discharged  today.    William Curtis MD    Electronically signed by William Curtis MD at 23 1358       Consult Notes (last 72 hours)  Notes from 23 1300 through 23 1300   No notes of this type exist for this encounter.            Discharge Summary      William Curtis MD at 23 1358                         [facility]         DISCHARGE SUMMARY    Patient Name: Bandar Rodriges  : 2000  MRN: [id number]    Date of Admission: 2023  Date of Discharge:  23  Primary Care Physician: Elle Coleman MD    Consults     No orders found from 3/15/2023 to 2023.          Presenting Problem:   Pregnancy [Z34.90]  Pregnant [Z34.90]    Active and Resolved Hospital Problems:  Active Hospital Problems    Diagnosis POA   • **Hyperemesis gravidarum with metabolic disturbance, antepartum [O21.1] Yes   • Pregnant [Z34.90] Not Applicable   • Severe Malnutrition (HCC) [E43] Yes   • Hypokalemia due to excessive gastrointestinal loss of potassium [E87.6] Yes      Resolved Hospital Problems    Diagnosis POA   • Pregnancy [Z34.90] Not Applicable         Hospital Course     Hospital Course:  Bandar Rodriges is a 23 y.o. female who was sent to the hospital from the office for persistent nausea and vomiting with signs of dehydration and weight loss.  Patient received IV fluid resuscitation, correction of her hypokalemia, rehydration, and diet was gradually increased.  Over the course of 3 days she was eventually able to tolerate a regular diet without any further episodes of emesis.  Discharge instruction given to the patient verbalized understanding.  She should follow-up in the office in 2 weeks.        Day of Discharge     Vital Signs:  Temp:  [98.1 °F (36.7 °C)-98.3 °F (36.8 °C)] 98.1 °F (36.7 °C)  Heart Rate:  [85-97] 97  Resp:  [16-18] 18  BP: (92-96)/(50-51) 96/51  Physical Exam:      Pertinent  and/or Most Recent Results     LAB RESULTS:      Lab 23  7661    WBC 12.89*   HEMOGLOBIN 16.2*   HEMATOCRIT 44.6   PLATELETS 313   NEUTROS ABS 9.46*   IMMATURE GRANS (ABS) 0.05   LYMPHS ABS 2.23   MONOS ABS 1.08*   EOS ABS 0.04   MCV 82.3         Lab 04/15/23  0547 04/14/23  1351 04/13/23  1842   SODIUM 140  --  133*   POTASSIUM 4.1  4.1 2.6* 2.8*   CHLORIDE 108*  --  90*   CO2 26.4  --  27.6   ANION GAP 5.6  --  15.4*   BUN 4*  --  13   CREATININE 0.53*  --  0.61   EGFR 134.3  --  129.8   GLUCOSE 85  --  89   CALCIUM 8.5*  --  10.1   MAGNESIUM  --  2.4  --          Lab 04/15/23  0547 04/13/23  1842   TOTAL PROTEIN 4.9* 7.5   ALBUMIN 3.4* 5.0   GLOBULIN 1.5 2.5   ALT (SGPT) 22 30   AST (SGOT) 13 19   BILIRUBIN 0.5 1.1   ALK PHOS 42 65                     Brief Urine Lab Results  (Last result in the past 365 days)      Color   Clarity   Blood   Leuk Est   Nitrite   Protein   CREAT   Urine HCG        04/14/23 2348 Yellow   Cloudy   Negative   Moderate (2+)   Negative   Negative               Microbiology Results (last 10 days)     ** No results found for the last 240 hours. **                         Labs Pending at Discharge: NA        Discharge Details        Discharge Medications      New Medications      Instructions Start Date   Bonjesta 20-20 MG tablet controlled-release  Generic drug: Doxylamine-Pyridoxine ER   1 tablet, Oral, 2 Times Daily      ondansetron ODT 4 MG disintegrating tablet  Commonly known as: ZOFRAN-ODT   Place 1 tablet on the tongue Every 8 (Eight) Hours As Needed for Nausea or Vomiting.      pantoprazole 40 MG EC tablet  Commonly known as: PROTONIX   40 mg, Oral, Every Morning Before Breakfast   Start Date: April 17, 2023        Continue These Medications      Instructions Start Date   promethazine 12.5 MG tablet  Commonly known as: PHENERGAN   12.5 mg, Oral, Every 6 Hours PRN             No Known Allergies      Discharge Disposition:  Home or Self Care    Diet:  Hospital:  Diet Order   Procedures   • Diet: Regular/House Diet; Texture: Regular Texture  (IDDSI 7); Fluid Consistency: Thin (IDDSI 0)         Discharge Activity:   Activity as tolerated      CODE STATUS:  Code Status and Medical Interventions:   Ordered at: 04/14/23 1343     Code Status (Patient has no pulse and is not breathing):    CPR (Attempt to Resuscitate)     Medical Interventions (Patient has pulse or is breathing):    Full         No future appointments.    Additional Instructions for the Follow-ups that You Need to Schedule     Call MD With Problems / Concerns   As directed      Instructions: Call for fever greater than 101, persistent nausea and vomiting despite medications, severe abdominal pain, or any other major medical concerns    Order Comments: Instructions: Call for fever greater than 101, persistent nausea and vomiting despite medications, severe abdominal pain, or any other major medical concerns          Discharge Follow-up with Specified Provider: Dr. Montaño; 2 Weeks   As directed      To: Dr. Montaño    Follow Up: 2 Weeks               Time spent on Discharge including face to face service:  Greater than 30 minutes    William Curtis MD      Electronically signed by William Curtis MD at 04/16/23 1400

## 2023-05-01 ENCOUNTER — TELEPHONE (OUTPATIENT)
Dept: OBSTETRICS AND GYNECOLOGY | Facility: CLINIC | Age: 23
End: 2023-05-01

## 2023-05-01 NOTE — TELEPHONE ENCOUNTER
"    Caller: Bandar Rodriges \"Rabia\"    Relationship to patient: Self    Best call back number: [phone]    Patient is needing:     SAME DAY R/S    PT R/S FROM 5/1/2023 @ 4:00 TO  5/8/2023 @ 4:00    DUE TO SCHEDULE CONFLICT  "

## 2023-05-08 ENCOUNTER — ROUTINE PRENATAL (OUTPATIENT)
Dept: OBSTETRICS AND GYNECOLOGY | Facility: CLINIC | Age: 23
End: 2023-05-08
Payer: COMMERCIAL

## 2023-05-08 VITALS — BODY MASS INDEX: 23.18 KG/M2 | WEIGHT: 148 LBS | DIASTOLIC BLOOD PRESSURE: 69 MMHG | SYSTOLIC BLOOD PRESSURE: 104 MMHG

## 2023-05-08 DIAGNOSIS — Z34.81 MULTIGRAVIDA IN FIRST TRIMESTER: Primary | ICD-10-CM

## 2023-05-08 LAB
GLUCOSE UR STRIP-MCNC: ABNORMAL MG/DL
PROT UR STRIP-MCNC: ABNORMAL MG/DL

## 2023-05-08 RX ORDER — PROMETHAZINE HYDROCHLORIDE 12.5 MG/1
12.5 TABLET ORAL EVERY 6 HOURS PRN
Qty: 20 TABLET | Refills: 0 | Status: SHIPPED | OUTPATIENT
Start: 2023-05-08

## 2023-05-08 RX ORDER — PANTOPRAZOLE SODIUM 40 MG/1
40 TABLET, DELAYED RELEASE ORAL
Qty: 30 TABLET | Refills: 1 | Status: SHIPPED | OUTPATIENT
Start: 2023-05-08 | End: 2023-06-07

## 2023-05-08 RX ORDER — DOXYLAMINE SUCCINATE AND PYRIDOXINE HYDROCHLORIDE 20; 20 MG/1; MG/1
1 TABLET, EXTENDED RELEASE ORAL 2 TIMES DAILY
Qty: 60 TABLET | Refills: 2 | Status: SHIPPED | OUTPATIENT
Start: 2023-05-08 | End: 2023-06-07

## 2023-05-08 NOTE — PROGRESS NOTES
Cc:  Pregnancy follow up.  Patient is still having nausea and vomiting, she state this has improved as long as she is taking her medication. She is requesting a refill on Zofran.  She has not used marijuana in at least 2 months.  No bleeding or spotting.  Hydrating well.  Vitals reviewed by me.  Gen - alert and pleasant.  Abdomen - nontender, no guarding or rebound.  A/P:  IUP at 15 weeks with nausea/vomiting in pregnancy  - N/V.  Refill Pepcid and Zofran.  Patient is not requiring medication as often.  Reassurance given.  - THC.  Patient encouraged to stay quit from use.  - Sonogram for anatomy at next visit.  - Discussed maternal well being.

## 2023-06-06 ENCOUNTER — ROUTINE PRENATAL (OUTPATIENT)
Dept: OBSTETRICS AND GYNECOLOGY | Facility: CLINIC | Age: 23
End: 2023-06-06
Payer: COMMERCIAL

## 2023-06-06 VITALS — BODY MASS INDEX: 25.37 KG/M2 | DIASTOLIC BLOOD PRESSURE: 74 MMHG | SYSTOLIC BLOOD PRESSURE: 114 MMHG | WEIGHT: 162 LBS

## 2023-06-06 DIAGNOSIS — Z3A.19 19 WEEKS GESTATION OF PREGNANCY: Primary | ICD-10-CM

## 2023-06-06 DIAGNOSIS — O46.8X1 SUBCHORIONIC HEMORRHAGE OF PLACENTA IN FIRST TRIMESTER, SINGLE OR UNSPECIFIED FETUS: ICD-10-CM

## 2023-06-06 DIAGNOSIS — Z34.82 MULTIGRAVIDA IN SECOND TRIMESTER: ICD-10-CM

## 2023-06-06 DIAGNOSIS — O41.8X10 SUBCHORIONIC HEMORRHAGE OF PLACENTA IN FIRST TRIMESTER, SINGLE OR UNSPECIFIED FETUS: ICD-10-CM

## 2023-06-06 LAB
EXPIRATION DATE: NORMAL
GLUCOSE UR STRIP-MCNC: NEGATIVE MG/DL
Lab: NORMAL
PROT UR STRIP-MCNC: NEGATIVE MG/DL

## 2023-06-06 PROCEDURE — 99213 OFFICE O/P EST LOW 20 MIN: CPT | Performed by: OBSTETRICS & GYNECOLOGY

## 2023-06-06 NOTE — PROGRESS NOTES
Cc:  Pregnancy follow up.  Patient overall feels well.  Had episode of lightheadedness and dizziness that was isolated - suboptimal fluid intake noted.  No bleeding or spotting.  Vitals reviewed by me.  Gen - alert and pleasant.  Abdomen - nontender, no guarding or rebound.  Ultrasound with normal anatomy but suboptimal renal visualization  A/P:  IUP at 19 weeks history of subchorionic hematomy, suboptimal sonogram.  - Hematoma.  Resolved with no symptoms.  - Suboptimal sonogram.  Follow up in 3 to 4 weeks.  - Discussed maternal well being, hydration.

## 2023-06-06 NOTE — PROGRESS NOTES
Patient states when she first woke a few weeks ago, she had felt dizzy and went to sit down and states she blacked out for a few moments. This was a isolated incidence.

## 2023-07-24 ENCOUNTER — PATIENT OUTREACH (OUTPATIENT)
Dept: LABOR AND DELIVERY | Facility: HOSPITAL | Age: 23
End: 2023-07-24
Payer: COMMERCIAL

## 2023-07-24 NOTE — OUTREACH NOTE
[facility]  Unable to Reach       Questions/Answers      Flowsheet Row Responses   Pending Outreach Confirm Patient Interest   Call Attempt First   Outcome No answer/busy   Next Call Attempt Date 07/26/23   Unable to reach comments: VM box full            Ange S - RN  Maternity Nurse Navigator    7/24/2023, 16:33 EDT

## 2023-07-26 ENCOUNTER — PATIENT OUTREACH (OUTPATIENT)
Dept: LABOR AND DELIVERY | Facility: HOSPITAL | Age: 23
End: 2023-07-26
Payer: COMMERCIAL

## 2023-07-26 NOTE — OUTREACH NOTE
[facility]  Unable to Reach       Questions/Answers      Flowsheet Row Responses   Pending Outreach Confirm Patient Interest   Call Attempt Second   Outcome Left message   Unable to reach comments: VM box full          UTR for program, please reach out with any specific patient needs.     Ange Bustillo RN  Maternity Nurse Navigator    7/26/2023, 13:43 EDT

## 2023-07-28 ENCOUNTER — ROUTINE PRENATAL (OUTPATIENT)
Dept: OBSTETRICS AND GYNECOLOGY | Facility: CLINIC | Age: 23
End: 2023-07-28
Payer: COMMERCIAL

## 2023-07-28 VITALS — SYSTOLIC BLOOD PRESSURE: 110 MMHG | DIASTOLIC BLOOD PRESSURE: 69 MMHG | BODY MASS INDEX: 25.09 KG/M2 | WEIGHT: 160.2 LBS

## 2023-07-28 DIAGNOSIS — Z3A.26 26 WEEKS GESTATION OF PREGNANCY: ICD-10-CM

## 2023-07-28 DIAGNOSIS — O46.8X1 SUBCHORIONIC HEMORRHAGE OF PLACENTA IN FIRST TRIMESTER, SINGLE OR UNSPECIFIED FETUS: ICD-10-CM

## 2023-07-28 DIAGNOSIS — O41.8X10 SUBCHORIONIC HEMORRHAGE OF PLACENTA IN FIRST TRIMESTER, SINGLE OR UNSPECIFIED FETUS: ICD-10-CM

## 2023-07-28 DIAGNOSIS — Z34.83 MULTIGRAVIDA IN THIRD TRIMESTER: Primary | ICD-10-CM

## 2023-07-28 RX ORDER — PRENATAL VIT NO.126/IRON/FOLIC 28MG-0.8MG
TABLET ORAL DAILY
COMMUNITY

## 2023-07-28 NOTE — PROGRESS NOTES
Cc:  Pregnancy follow up.  No major complaints.  Good FM.  No bleeding or spotting.  Patient hydrating well and taking vitamins.  Vitals reviewed by me.  Gen - alert and pleasant.  Abdomen - gravid  Glucola today.  A/P:  IUP at 26 weeks with history of subchorionic hemorrhage in first trimester.  - Growth ultrasound at next visit.  - Discussed management of 3rd trimester and maternal well being.

## 2023-07-29 ENCOUNTER — TELEPHONE (OUTPATIENT)
Dept: OBSTETRICS AND GYNECOLOGY | Facility: CLINIC | Age: 23
End: 2023-07-29
Payer: COMMERCIAL

## 2023-08-24 ENCOUNTER — ROUTINE PRENATAL (OUTPATIENT)
Dept: OBSTETRICS AND GYNECOLOGY | Facility: CLINIC | Age: 23
End: 2023-08-24
Payer: COMMERCIAL

## 2023-08-24 VITALS — BODY MASS INDEX: 25.37 KG/M2 | WEIGHT: 162 LBS

## 2023-08-24 DIAGNOSIS — Z34.83 MULTIGRAVIDA IN THIRD TRIMESTER: ICD-10-CM

## 2023-08-24 DIAGNOSIS — Z3A.30 30 WEEKS GESTATION OF PREGNANCY: Primary | ICD-10-CM

## 2023-08-24 LAB
EXPIRATION DATE: ABNORMAL
GLUCOSE UR STRIP-MCNC: NEGATIVE MG/DL
Lab: ABNORMAL
PROT UR STRIP-MCNC: ABNORMAL MG/DL

## 2023-08-24 NOTE — PROGRESS NOTES
Cc:  Pregnancy follow up.  No major complaints.  Good FM.  No bleeding or spotting.  No abdominal pain.  Vitals reviewed by me.  Gen - alert and pleasant.  Abdomen - nontender, no guarding or rebound.  Ultrasound with normal growth, OSCAR.  A/P:  IUP at 30 weeks with history of subchorionic hematoma  - Hematoma.  Patient at risk for growth abnormalities; however, sonogram normal today with adequate growth.  Reassurance given.  - Discussed maternal well being.

## 2023-09-07 ENCOUNTER — ROUTINE PRENATAL (OUTPATIENT)
Dept: OBSTETRICS AND GYNECOLOGY | Facility: CLINIC | Age: 23
End: 2023-09-07
Payer: COMMERCIAL

## 2023-09-07 VITALS — DIASTOLIC BLOOD PRESSURE: 68 MMHG | SYSTOLIC BLOOD PRESSURE: 107 MMHG | BODY MASS INDEX: 25.37 KG/M2 | WEIGHT: 162 LBS

## 2023-09-07 DIAGNOSIS — Z3A.32 32 WEEKS GESTATION OF PREGNANCY: Primary | ICD-10-CM

## 2023-09-07 DIAGNOSIS — Z34.83 MULTIGRAVIDA IN THIRD TRIMESTER: ICD-10-CM

## 2023-09-07 NOTE — PROGRESS NOTES
Cc:  Pregnancy follow up.  No complaints.  Good FM.  No bleeding or spotting.  Vitals reviewed by me.  Gen - alert and pleasant.  Abdomen - nontender, no guarding or rebound.  A/P:  IUP at 32 weeks  - Discussed routine care in 3rd trimester.

## 2023-09-15 ENCOUNTER — TELEPHONE (OUTPATIENT)
Dept: OBSTETRICS AND GYNECOLOGY | Facility: CLINIC | Age: 23
End: 2023-09-15

## 2023-09-15 NOTE — TELEPHONE ENCOUNTER
I called the patient to move her appointment from [place] to Dr. Montaño on the 21st. I left a message for pt to confirm the move and time. 9-15-23/lw

## 2023-09-21 ENCOUNTER — ROUTINE PRENATAL (OUTPATIENT)
Dept: OBSTETRICS AND GYNECOLOGY | Facility: CLINIC | Age: 23
End: 2023-09-21
Payer: COMMERCIAL

## 2023-09-21 VITALS — BODY MASS INDEX: 26.25 KG/M2 | WEIGHT: 167.6 LBS | SYSTOLIC BLOOD PRESSURE: 120 MMHG | DIASTOLIC BLOOD PRESSURE: 78 MMHG

## 2023-09-21 DIAGNOSIS — Z34.83 PRENATAL CARE, SUBSEQUENT PREGNANCY IN THIRD TRIMESTER: Primary | ICD-10-CM

## 2023-09-21 DIAGNOSIS — Z3A.34 34 WEEKS GESTATION OF PREGNANCY: ICD-10-CM

## 2023-09-21 DIAGNOSIS — O21.9 NAUSEA AND VOMITING DURING PREGNANCY: ICD-10-CM

## 2023-09-21 LAB
GLUCOSE UR STRIP-MCNC: NEGATIVE MG/DL
PROT UR STRIP-MCNC: ABNORMAL MG/DL

## 2023-09-21 NOTE — PROGRESS NOTES
Cc:  Pregnancy follow up.  Patient feels nauseated today but generally does not have this daily any more.  No bleeding or spotting.  Some cramping.  Vitals reviewed by me.  Gen - alert and pleasant.  Abdomen - nontender, no guarding or rebound.  A/P:  IUP at 34 weeks with nausea  - Reassurance given.  - Follow up in 2 weeks and then weekly.  - Discussed maternal well being.

## 2023-10-09 ENCOUNTER — ANESTHESIA EVENT (OUTPATIENT)
Dept: LABOR AND DELIVERY | Facility: HOSPITAL | Age: 23
End: 2023-10-09
Payer: COMMERCIAL

## 2023-10-09 ENCOUNTER — ANESTHESIA (OUTPATIENT)
Dept: LABOR AND DELIVERY | Facility: HOSPITAL | Age: 23
End: 2023-10-09
Payer: COMMERCIAL

## 2023-10-09 ENCOUNTER — HOSPITAL ENCOUNTER (INPATIENT)
Facility: HOSPITAL | Age: 23
LOS: 2 days | Discharge: HOME | End: 2023-10-11
Attending: OBSTETRICS & GYNECOLOGY | Admitting: OBSTETRICS & GYNECOLOGY
Payer: COMMERCIAL

## 2023-10-09 PROBLEM — Z34.90 PREGNANCY: Status: ACTIVE | Noted: 2023-10-09

## 2023-10-09 LAB
ABO GROUP BLD: NORMAL
ALBUMIN SERPL-MCNC: 3.6 G/DL (ref 3.5–5.2)
ALBUMIN/GLOB SERPL: 1.5 G/DL
ALP SERPL-CCNC: 112 U/L (ref 39–117)
ALT SERPL W P-5'-P-CCNC: 7 U/L (ref 1–33)
ANION GAP SERPL CALCULATED.3IONS-SCNC: 11 MMOL/L (ref 5–15)
AST SERPL-CCNC: 11 U/L (ref 1–32)
BASOPHILS # BLD AUTO: 0.01 10*3/MM3 (ref 0–0.2)
BASOPHILS NFR BLD AUTO: 0.1 % (ref 0–1.5)
BILIRUB SERPL-MCNC: 0.3 MG/DL (ref 0–1.2)
BLD GP AB SCN SERPL QL: NEGATIVE
BUN SERPL-MCNC: 3 MG/DL (ref 6–20)
BUN/CREAT SERPL: 7.1 (ref 7–25)
CALCIUM SPEC-SCNC: 8.6 MG/DL (ref 8.6–10.5)
CHLORIDE SERPL-SCNC: 106 MMOL/L (ref 98–107)
CO2 SERPL-SCNC: 19 MMOL/L (ref 22–29)
CREAT SERPL-MCNC: 0.42 MG/DL (ref 0.57–1)
DEPRECATED RDW RBC AUTO: 36.1 FL (ref 37–54)
EGFRCR SERPLBLD CKD-EPI 2021: 142 ML/MIN/1.73
EOSINOPHIL # BLD AUTO: 0.08 10*3/MM3 (ref 0–0.4)
EOSINOPHIL NFR BLD AUTO: 1.1 % (ref 0.3–6.2)
ERYTHROCYTE [DISTWIDTH] IN BLOOD BY AUTOMATED COUNT: 12.1 % (ref 12.3–15.4)
GLOBULIN UR ELPH-MCNC: 2.4 GM/DL
GLUCOSE SERPL-MCNC: 75 MG/DL (ref 65–99)
HCT VFR BLD AUTO: 29.3 % (ref 34–46.6)
HGB BLD-MCNC: 9.9 G/DL (ref 12–15.9)
IMM GRANULOCYTES # BLD AUTO: 0.03 10*3/MM3 (ref 0–0.05)
IMM GRANULOCYTES NFR BLD AUTO: 0.4 % (ref 0–0.5)
LYMPHOCYTES # BLD AUTO: 2.18 10*3/MM3 (ref 0.7–3.1)
LYMPHOCYTES NFR BLD AUTO: 30.4 % (ref 19.6–45.3)
MCH RBC QN AUTO: 28 PG (ref 26.6–33)
MCHC RBC AUTO-ENTMCNC: 33.8 G/DL (ref 31.5–35.7)
MCV RBC AUTO: 82.8 FL (ref 79–97)
MONOCYTES # BLD AUTO: 0.68 10*3/MM3 (ref 0.1–0.9)
MONOCYTES NFR BLD AUTO: 9.5 % (ref 5–12)
NEUTROPHILS NFR BLD AUTO: 4.19 10*3/MM3 (ref 1.7–7)
NEUTROPHILS NFR BLD AUTO: 58.5 % (ref 42.7–76)
NRBC BLD AUTO-RTO: 0 /100 WBC (ref 0–0.2)
PLATELET # BLD AUTO: 209 10*3/MM3 (ref 140–450)
PMV BLD AUTO: 10 FL (ref 6–12)
POTASSIUM SERPL-SCNC: 3.4 MMOL/L (ref 3.5–5.2)
PROT SERPL-MCNC: 6 G/DL (ref 6–8.5)
RBC # BLD AUTO: 3.54 10*6/MM3 (ref 3.77–5.28)
RH BLD: POSITIVE
SODIUM SERPL-SCNC: 136 MMOL/L (ref 136–145)
T&S EXPIRATION DATE: NORMAL
WBC NRBC COR # BLD: 7.17 10*3/MM3 (ref 3.4–10.8)

## 2023-10-09 PROCEDURE — 59410 OBSTETRICAL CARE: CPT | Performed by: OBSTETRICS & GYNECOLOGY

## 2023-10-09 PROCEDURE — 25810000003 LACTATED RINGERS PER 1000 ML: Performed by: OBSTETRICS & GYNECOLOGY

## 2023-10-09 PROCEDURE — 86850 RBC ANTIBODY SCREEN: CPT | Performed by: OBSTETRICS & GYNECOLOGY

## 2023-10-09 PROCEDURE — C1755 CATHETER, INTRASPINAL: HCPCS

## 2023-10-09 PROCEDURE — S0260 H&P FOR SURGERY: HCPCS | Performed by: OBSTETRICS & GYNECOLOGY

## 2023-10-09 PROCEDURE — 80053 COMPREHEN METABOLIC PANEL: CPT | Performed by: OBSTETRICS & GYNECOLOGY

## 2023-10-09 PROCEDURE — C1755 CATHETER, INTRASPINAL: HCPCS | Performed by: ANESTHESIOLOGY

## 2023-10-09 PROCEDURE — 86900 BLOOD TYPING SEROLOGIC ABO: CPT | Performed by: OBSTETRICS & GYNECOLOGY

## 2023-10-09 PROCEDURE — 99202 OFFICE O/P NEW SF 15 MIN: CPT | Performed by: OBSTETRICS & GYNECOLOGY

## 2023-10-09 PROCEDURE — 86901 BLOOD TYPING SEROLOGIC RH(D): CPT | Performed by: OBSTETRICS & GYNECOLOGY

## 2023-10-09 PROCEDURE — 85025 COMPLETE CBC W/AUTO DIFF WBC: CPT | Performed by: OBSTETRICS & GYNECOLOGY

## 2023-10-09 PROCEDURE — 25010000002 ONDANSETRON PER 1 MG: Performed by: OBSTETRICS & GYNECOLOGY

## 2023-10-09 RX ORDER — SODIUM CHLORIDE 0.9 % (FLUSH) 0.9 %
10 SYRINGE (ML) INJECTION AS NEEDED
Status: DISCONTINUED | OUTPATIENT
Start: 2023-10-09 | End: 2023-10-09 | Stop reason: HOSPADM

## 2023-10-09 RX ORDER — SODIUM CHLORIDE 0.9 % (FLUSH) 0.9 %
1-10 SYRINGE (ML) INJECTION AS NEEDED
Status: DISCONTINUED | OUTPATIENT
Start: 2023-10-09 | End: 2023-10-11 | Stop reason: HOSPADM

## 2023-10-09 RX ORDER — ONDANSETRON 4 MG/1
4 TABLET, FILM COATED ORAL EVERY 6 HOURS PRN
Status: DISCONTINUED | OUTPATIENT
Start: 2023-10-09 | End: 2023-10-11 | Stop reason: HOSPADM

## 2023-10-09 RX ORDER — HYDROCODONE BITARTRATE AND ACETAMINOPHEN 10; 325 MG/1; MG/1
1 TABLET ORAL EVERY 4 HOURS PRN
Status: DISCONTINUED | OUTPATIENT
Start: 2023-10-09 | End: 2023-10-11 | Stop reason: HOSPADM

## 2023-10-09 RX ORDER — DIPHENHYDRAMINE HYDROCHLORIDE 50 MG/ML
12.5 INJECTION INTRAMUSCULAR; INTRAVENOUS EVERY 8 HOURS PRN
Status: DISCONTINUED | OUTPATIENT
Start: 2023-10-09 | End: 2023-10-09 | Stop reason: HOSPADM

## 2023-10-09 RX ORDER — ACETAMINOPHEN 325 MG/1
650 TABLET ORAL EVERY 4 HOURS PRN
Status: DISCONTINUED | OUTPATIENT
Start: 2023-10-09 | End: 2023-10-09 | Stop reason: HOSPADM

## 2023-10-09 RX ORDER — MISOPROSTOL 200 UG/1
800 TABLET ORAL ONCE AS NEEDED
Status: DISCONTINUED | OUTPATIENT
Start: 2023-10-09 | End: 2023-10-09 | Stop reason: HOSPADM

## 2023-10-09 RX ORDER — DIPHENHYDRAMINE HCL 25 MG
25 CAPSULE ORAL NIGHTLY PRN
Status: DISCONTINUED | OUTPATIENT
Start: 2023-10-09 | End: 2023-10-11 | Stop reason: HOSPADM

## 2023-10-09 RX ORDER — TERBUTALINE SULFATE 1 MG/ML
0.25 INJECTION, SOLUTION SUBCUTANEOUS AS NEEDED
Status: DISCONTINUED | OUTPATIENT
Start: 2023-10-09 | End: 2023-10-09 | Stop reason: HOSPADM

## 2023-10-09 RX ORDER — OXYTOCIN/0.9 % SODIUM CHLORIDE 30/500 ML
999 PLASTIC BAG, INJECTION (ML) INTRAVENOUS ONCE
Status: DISCONTINUED | OUTPATIENT
Start: 2023-10-09 | End: 2023-10-09 | Stop reason: HOSPADM

## 2023-10-09 RX ORDER — HYDROCODONE BITARTRATE AND ACETAMINOPHEN 5; 325 MG/1; MG/1
1 TABLET ORAL EVERY 4 HOURS PRN
Status: DISCONTINUED | OUTPATIENT
Start: 2023-10-09 | End: 2023-10-11 | Stop reason: HOSPADM

## 2023-10-09 RX ORDER — SODIUM CHLORIDE 9 MG/ML
40 INJECTION, SOLUTION INTRAVENOUS AS NEEDED
Status: DISCONTINUED | OUTPATIENT
Start: 2023-10-09 | End: 2023-10-09 | Stop reason: HOSPADM

## 2023-10-09 RX ORDER — PRENATAL VIT/IRON FUM/FOLIC AC 27MG-0.8MG
1 TABLET ORAL DAILY
Status: DISCONTINUED | OUTPATIENT
Start: 2023-10-10 | End: 2023-10-11 | Stop reason: HOSPADM

## 2023-10-09 RX ORDER — ONDANSETRON 2 MG/ML
4 INJECTION INTRAMUSCULAR; INTRAVENOUS EVERY 6 HOURS PRN
Status: DISCONTINUED | OUTPATIENT
Start: 2023-10-09 | End: 2023-10-09 | Stop reason: HOSPADM

## 2023-10-09 RX ORDER — OXYTOCIN/0.9 % SODIUM CHLORIDE 30/500 ML
250 PLASTIC BAG, INJECTION (ML) INTRAVENOUS CONTINUOUS
Status: DISPENSED | OUTPATIENT
Start: 2023-10-09 | End: 2023-10-09

## 2023-10-09 RX ORDER — MAGNESIUM CARB/ALUMINUM HYDROX 105-160MG
30 TABLET,CHEWABLE ORAL ONCE AS NEEDED
Status: DISCONTINUED | OUTPATIENT
Start: 2023-10-09 | End: 2023-10-09 | Stop reason: HOSPADM

## 2023-10-09 RX ORDER — FAMOTIDINE 20 MG/1
20 TABLET, FILM COATED ORAL 2 TIMES DAILY PRN
Status: DISCONTINUED | OUTPATIENT
Start: 2023-10-09 | End: 2023-10-09 | Stop reason: HOSPADM

## 2023-10-09 RX ORDER — DOCUSATE SODIUM 100 MG/1
100 CAPSULE, LIQUID FILLED ORAL 2 TIMES DAILY
Status: DISCONTINUED | OUTPATIENT
Start: 2023-10-09 | End: 2023-10-11 | Stop reason: HOSPADM

## 2023-10-09 RX ORDER — TRANEXAMIC ACID 10 MG/ML
1000 INJECTION, SOLUTION INTRAVENOUS ONCE AS NEEDED
Status: DISCONTINUED | OUTPATIENT
Start: 2023-10-09 | End: 2023-10-11 | Stop reason: HOSPADM

## 2023-10-09 RX ORDER — ONDANSETRON 2 MG/ML
4 INJECTION INTRAMUSCULAR; INTRAVENOUS ONCE AS NEEDED
Status: DISCONTINUED | OUTPATIENT
Start: 2023-10-09 | End: 2023-10-09 | Stop reason: HOSPADM

## 2023-10-09 RX ORDER — BISACODYL 10 MG
10 SUPPOSITORY, RECTAL RECTAL DAILY PRN
Status: DISCONTINUED | OUTPATIENT
Start: 2023-10-10 | End: 2023-10-11 | Stop reason: HOSPADM

## 2023-10-09 RX ORDER — ONDANSETRON 4 MG/1
4 TABLET, FILM COATED ORAL EVERY 6 HOURS PRN
Status: DISCONTINUED | OUTPATIENT
Start: 2023-10-09 | End: 2023-10-09 | Stop reason: HOSPADM

## 2023-10-09 RX ORDER — LIDOCAINE HYDROCHLORIDE AND EPINEPHRINE 15; 5 MG/ML; UG/ML
INJECTION, SOLUTION EPIDURAL AS NEEDED
Status: DISCONTINUED | OUTPATIENT
Start: 2023-10-09 | End: 2023-10-09 | Stop reason: SURG

## 2023-10-09 RX ORDER — CARBOPROST TROMETHAMINE 250 UG/ML
250 INJECTION, SOLUTION INTRAMUSCULAR
Status: DISCONTINUED | OUTPATIENT
Start: 2023-10-09 | End: 2023-10-09 | Stop reason: HOSPADM

## 2023-10-09 RX ORDER — FAMOTIDINE 10 MG/ML
20 INJECTION, SOLUTION INTRAVENOUS ONCE AS NEEDED
Status: DISCONTINUED | OUTPATIENT
Start: 2023-10-09 | End: 2023-10-09 | Stop reason: HOSPADM

## 2023-10-09 RX ORDER — SODIUM CHLORIDE, SODIUM LACTATE, POTASSIUM CHLORIDE, CALCIUM CHLORIDE 600; 310; 30; 20 MG/100ML; MG/100ML; MG/100ML; MG/100ML
125 INJECTION, SOLUTION INTRAVENOUS CONTINUOUS
Status: DISCONTINUED | OUTPATIENT
Start: 2023-10-09 | End: 2023-10-11 | Stop reason: HOSPADM

## 2023-10-09 RX ORDER — FAMOTIDINE 10 MG/ML
20 INJECTION, SOLUTION INTRAVENOUS 2 TIMES DAILY PRN
Status: DISCONTINUED | OUTPATIENT
Start: 2023-10-09 | End: 2023-10-09 | Stop reason: HOSPADM

## 2023-10-09 RX ORDER — EPHEDRINE SULFATE 50 MG/ML
5 INJECTION, SOLUTION INTRAVENOUS AS NEEDED
Status: DISCONTINUED | OUTPATIENT
Start: 2023-10-09 | End: 2023-10-09 | Stop reason: HOSPADM

## 2023-10-09 RX ORDER — FENTANYL CIT 0.2 MG/100ML-ROPIV 0.2%-NACL 0.9% EPIDURAL INJ 2/0.2 MCG/ML-%
10 SOLUTION INJECTION CONTINUOUS
Status: DISCONTINUED | OUTPATIENT
Start: 2023-10-09 | End: 2023-10-11 | Stop reason: HOSPADM

## 2023-10-09 RX ORDER — ACETAMINOPHEN 325 MG/1
650 TABLET ORAL EVERY 6 HOURS PRN
Status: DISCONTINUED | OUTPATIENT
Start: 2023-10-09 | End: 2023-10-11 | Stop reason: HOSPADM

## 2023-10-09 RX ORDER — IBUPROFEN 600 MG/1
600 TABLET ORAL EVERY 6 HOURS PRN
Status: DISCONTINUED | OUTPATIENT
Start: 2023-10-09 | End: 2023-10-11 | Stop reason: HOSPADM

## 2023-10-09 RX ORDER — OXYTOCIN/0.9 % SODIUM CHLORIDE 30/500 ML
125 PLASTIC BAG, INJECTION (ML) INTRAVENOUS CONTINUOUS PRN
Status: COMPLETED | OUTPATIENT
Start: 2023-10-09 | End: 2023-10-09

## 2023-10-09 RX ORDER — SODIUM CHLORIDE 0.9 % (FLUSH) 0.9 %
10 SYRINGE (ML) INJECTION EVERY 12 HOURS SCHEDULED
Status: DISCONTINUED | OUTPATIENT
Start: 2023-10-09 | End: 2023-10-09 | Stop reason: HOSPADM

## 2023-10-09 RX ORDER — METHYLERGONOVINE MALEATE 0.2 MG/ML
200 INJECTION INTRAVENOUS ONCE AS NEEDED
Status: DISCONTINUED | OUTPATIENT
Start: 2023-10-09 | End: 2023-10-09 | Stop reason: HOSPADM

## 2023-10-09 RX ORDER — OXYTOCIN/0.9 % SODIUM CHLORIDE 30/500 ML
2-20 PLASTIC BAG, INJECTION (ML) INTRAVENOUS
Status: DISCONTINUED | OUTPATIENT
Start: 2023-10-09 | End: 2023-10-11 | Stop reason: HOSPADM

## 2023-10-09 RX ORDER — ONDANSETRON 2 MG/ML
4 INJECTION INTRAMUSCULAR; INTRAVENOUS EVERY 6 HOURS PRN
Status: DISCONTINUED | OUTPATIENT
Start: 2023-10-09 | End: 2023-10-11 | Stop reason: HOSPADM

## 2023-10-09 RX ORDER — HYDROCORTISONE 25 MG/G
1 CREAM TOPICAL AS NEEDED
Status: DISCONTINUED | OUTPATIENT
Start: 2023-10-09 | End: 2023-10-11 | Stop reason: HOSPADM

## 2023-10-09 RX ORDER — LIDOCAINE HYDROCHLORIDE 10 MG/ML
0.5 INJECTION, SOLUTION INFILTRATION; PERINEURAL ONCE AS NEEDED
Status: DISCONTINUED | OUTPATIENT
Start: 2023-10-09 | End: 2023-10-09 | Stop reason: HOSPADM

## 2023-10-09 RX ADMIN — SODIUM CHLORIDE, POTASSIUM CHLORIDE, SODIUM LACTATE AND CALCIUM CHLORIDE 125 ML/HR: 600; 310; 30; 20 INJECTION, SOLUTION INTRAVENOUS at 14:37

## 2023-10-09 RX ADMIN — Medication 2 MILLI-UNITS/MIN: at 10:59

## 2023-10-09 RX ADMIN — SODIUM CHLORIDE, POTASSIUM CHLORIDE, SODIUM LACTATE AND CALCIUM CHLORIDE 125 ML/HR: 600; 310; 30; 20 INJECTION, SOLUTION INTRAVENOUS at 06:45

## 2023-10-09 RX ADMIN — SODIUM CHLORIDE, POTASSIUM CHLORIDE, SODIUM LACTATE AND CALCIUM CHLORIDE 125 ML/HR: 600; 310; 30; 20 INJECTION, SOLUTION INTRAVENOUS at 08:34

## 2023-10-09 RX ADMIN — DOCUSATE SODIUM 100 MG: 100 CAPSULE, LIQUID FILLED ORAL at 21:20

## 2023-10-09 RX ADMIN — LIDOCAINE HYDROCHLORIDE,EPINEPHRINE BITARTRATE 3 ML: 15; .005 INJECTION, SOLUTION EPIDURAL; INFILTRATION; INTRACAUDAL; PERINEURAL at 08:20

## 2023-10-09 RX ADMIN — Medication: at 21:20

## 2023-10-09 RX ADMIN — Medication 1 APPLICATION: at 21:20

## 2023-10-09 RX ADMIN — LIDOCAINE HYDROCHLORIDE,EPINEPHRINE BITARTRATE 3 ML: 15; .005 INJECTION, SOLUTION EPIDURAL; INFILTRATION; INTRACAUDAL; PERINEURAL at 08:17

## 2023-10-09 RX ADMIN — EPHEDRINE SULFATE 5 MG: 50 INJECTION INTRAVENOUS at 09:39

## 2023-10-09 RX ADMIN — ONDANSETRON 4 MG: 2 INJECTION INTRAMUSCULAR; INTRAVENOUS at 12:01

## 2023-10-09 RX ADMIN — Medication 10 ML/HR: at 08:23

## 2023-10-09 RX ADMIN — IBUPROFEN 600 MG: 600 TABLET, FILM COATED ORAL at 21:19

## 2023-10-09 RX ADMIN — Medication 125 ML/HR: at 18:30

## 2023-10-09 RX ADMIN — Medication 10 ML/HR: at 14:13

## 2023-10-09 RX ADMIN — EPHEDRINE SULFATE 5 MG: 50 INJECTION INTRAVENOUS at 09:36

## 2023-10-09 NOTE — L&D DELIVERY NOTE
"[facility]  Vaginal Delivery Note   Review the Delivery Report for details.       Delivery     Delivery:      YOB: 2022    Time of Birth:  Gestational Age 5:00 PM   37w0d     Anesthesia:      Delivering clinician:     Forceps?   No   Vacuum? No    Shoulder dystocia present: No        Delivery narrative: Called to room with patient complete and +2 station.  Fetal tracing was category 1.  She is prepped and draped in usual manner and pushes and delivery occurs left occiput anterior over intact perineum.  There is no nuchal cord.  Anterior shoulder clears with gentle downward traction and continued maternal expulsive effort.  Baby was then delivered and placed on maternal abdomen.  30 seconds transpires prior to cord clamping.  Cord blood is obtained.  Inspection shows no lacerations.  About 4 to 5 minutes later, and with mild suprapubic pressure and gentle cord traction, the placenta delivered spontaneously and intact.  Mom and baby both doing very well in delivery room.    Infant    Findings: female  infant     Infant observations: Weight: No birth weight on file.   Length:   in  Observations/Comments:  scale 4      Apgars:   @ 1 minute /      @ 5 minutes   Infant Name: .     Placenta, Cord, and Fluid    Placenta delivered    at        Cord:   present.   Nuchal Cord?  no   Cord blood obtained:     Cord gases obtained:      Cord gas results: Venous:  No results found for: \"PHCVEN\", \"BECVEN\"    Arterial:  No results found for: \"PHCART\", \"BECART\"     Repair    Episiotomy: Not recorded     No    Lacerations: No   Estimated Blood Loss:  100 cc             Quantitative Blood Loss: Quantitative Blood Loss (mL): 112 mL (10/09/23 1704)                Complications  none    Disposition  Mother to Mother Baby/Postpartum  in stable condition currently.  Baby to remains with mom  in stable condition currently.      Josiah Wang MD  10/09/23  17:12 EDT        "

## 2023-10-09 NOTE — PLAN OF CARE
Goal Outcome Evaluation:  Plan of Care Reviewed With: patient        Progress: improving  Outcome Evaluation: s/p vaginal delivery of baby girl. fundal checks WNL. patiet denies pain at this time. epidural catheter removed. transfer to postpartum at end of recovery

## 2023-10-09 NOTE — OBED NOTES
[facility]  Bandar Julien  : 2000  MRN: [id number]  CSN: [id number]    OB ED Provider Note    Subjective   Chief Complaint   Patient presents with    Rupture of Membranes     OLINDA  37 weeks presents with c/o ofgush of clear fluid around 0310, and continued leaking since, denies any bleeding or ctx, reports +FM     Bandar Julien is a 23 y.o. year old  with an Estimated Date of Delivery: 10/30/23 currently at 37w0d presenting with SROM at 0310. Clear fluid was noted with ongoing fluid loss.  She is not noting CTX. No VB noted. FM is present..    Prenatal care has been with Dr. Montaño.  It has been benign.    OB History    Para Term  AB Living   2 1 1 0 0 1   SAB IAB Ectopic Molar Multiple Live Births   0 0 0 0 0 1      # Outcome Date GA Lbr Sanjiv/2nd Weight Sex Delivery Anes PTL Lv   2 Current            1 Term 20 39w5d 11:19 / 00:49 3546 g (7 lb 13.1 oz) M Vag-Spont EPI N TAMMY      Birth Comments: scale 4      Name: TAYLOR JULIEN      Apgar1: 8  Apgar5: 9     Past Medical History:   Diagnosis Date    Urogenital trichomoniasis      Past Surgical History:   Procedure Laterality Date    WISDOM TOOTH EXTRACTION         Current Facility-Administered Medications:     acetaminophen (TYLENOL) tablet 650 mg, 650 mg, Oral, Q4H PRN, Edwin Montaño MD    famotidine (PEPCID) injection 20 mg, 20 mg, Intravenous, BID PRN **OR** famotidine (PEPCID) tablet 20 mg, 20 mg, Oral, BID PRN, Edwin Montaño MD    lactated ringers bolus 1,000 mL, 1,000 mL, Intravenous, Once PRN, Edwin Montaño MD    lactated ringers infusion, 125 mL/hr, Intravenous, Continuous, Edwin Montaño MD    lidocaine (XYLOCAINE) 1 % injection 0.5 mL, 0.5 mL, Intradermal, Once PRN, Edwin Montaño MD    mineral oil liquid 30 mL, 30 mL, Topical, Once PRN, Edwin Montaño MD    ondansetron (ZOFRAN) tablet 4 mg, 4 mg, Oral, Q6H PRN **OR** ondansetron (ZOFRAN)  "injection 4 mg, 4 mg, Intravenous, Q6H PRN, Edwin Montaño MD    sodium chloride 0.9 % flush 10 mL, 10 mL, Intravenous, Q12H, Edwin Montaño MD    sodium chloride 0.9 % flush 10 mL, 10 mL, Intravenous, PRN, Edwin Montaño MD    sodium chloride 0.9 % infusion 40 mL, 40 mL, Intravenous, PRN, Edwin Montaño MD    terbutaline (BRETHINE) injection 0.25 mg, 0.25 mg, Subcutaneous, PRN, Edwin Montaño MD    No Known Allergies  Social History    Tobacco Use      Smoking status: Never      Smokeless tobacco: Never    Review of Systems   Genitourinary:  Positive for vaginal discharge.   All other systems reviewed and are negative.        Objective   /65   Pulse 79   Temp 97.4 øF (36.3 øC) (Oral)   Resp 16   Ht 170.2 cm (67\")   Wt 78 kg (172 lb)   LMP 01/13/2023   SpO2 100%   Breastfeeding Yes   BMI 26.94 kg/mý   General: well developed; well nourished  no acute distress   Abdomen: soft, non-tender; no masses  gravid    FHT's: reactive and category 1      Cervix: was checked (by RN): 2 cm / 50 % / -3 grossly ruptured   Presentation: cephalic   Contractions: every 4-10 minutes   Chest: Unlabored respirations    CV:  RRR   Ext:   No C/C/E   Back: CVA tenderness is deferred bilateral        Prenatal Labs  Lab Results   Component Value Date    HGB 16.2 (H) 04/13/2023    RUBELLAABIGG 2.86 02/27/2023    HEPBSAG Negative 02/27/2023    ABSCRN Negative 02/27/2023    RZS9ZLQ8 Non Reactive 02/27/2023    HEPCVIRUSABY Non Reactive 02/27/2023    GCT 70 07/28/2023    GGTFASTING CANCELED 09/22/2020    WAE5MVVC CANCELED 09/22/2020    WQH4UCJR CANCELED 09/22/2020    DXD3LMHR CANCELED 09/22/2020    STREPGPB Negative 11/04/2020    URINECX Final report (A) 02/27/2023    CHLAMNAA Negative 02/27/2023    NGONORRHON Negative 02/27/2023       Current Labs Reviewed   UA:    Lab Results   Component Value Date    SQUAMEPIUA 7-12 (A) 04/14/2023    SPECGRAVUR <1.005 (L) 04/14/2023    KETONESU " Negative 04/14/2023    BLOODU Negative 04/14/2023    LEUKOCYTESUR Moderate (2+) (A) 04/14/2023    NITRITEU Negative 04/14/2023    RBCUA 0-2 04/14/2023    WBCUA 13-20 (A) 04/14/2023    BACTERIA 1+ (A) 04/14/2023          Assessment   IUP at 37w0d  PROM- not actively laboring. GBS unknown     Plan   Admit to L&D for pitocin augmentation. GBS prophylaxis per Dr. Montaño who was notified and is assuming management.    Deshawn Ochoa MD  10/9/2023  06:37 EDT

## 2023-10-09 NOTE — ANESTHESIA PREPROCEDURE EVALUATION
Anesthesia Evaluation     no history of anesthetic complications:                Airway   Neck ROM: full  no difficulty expected  Dental - normal exam     Pulmonary - negative pulmonary ROS and normal exam   (-) COPD, asthma, sleep apnea, not a smoker    PE comment: nonlabored  Cardiovascular - negative cardio ROS and normal exam  Exercise tolerance: good (4-7 METS)    Rhythm: regular  Rate: normal    (-) hypertension, valvular problems/murmurs, past MI, CAD, dysrhythmias, angina      Neuro/Psych- negative ROS  (-) seizures, TIA, CVA  GI/Hepatic/Renal/Endo - negative ROS   (-) GERD, liver disease, no renal disease, diabetes, no thyroid disorder    Musculoskeletal (-) negative ROS    Abdominal    Substance History   (+) drug use (THC hx)     OB/GYN    (+) PregnantPreeclampsia assessment negative: @37wks 0days.        Other                    Anesthesia Plan    ASA 2     epidural       Anesthetic plan, risks, benefits, and alternatives have been provided, discussed and informed consent has been obtained with: patient.    CODE STATUS:    Level Of Support Discussed With: Patient  Code Status (Patient has no pulse and is not breathing): CPR (Attempt to Resuscitate)  Medical Interventions (Patient has pulse or is breathing): Full Support

## 2023-10-09 NOTE — ANESTHESIA PROCEDURE NOTES
Labor Epidural      Patient location during procedure: OB  Indication:at surgeon's request  Performed By  Anesthesiologist: Keshav Woody MD  Preanesthetic Checklist  Completed: patient identified, IV checked, site marked, risks and benefits discussed, surgical consent, monitors and equipment checked, pre-op evaluation and timeout performed  Additional Notes  Connected epidural catheter to PCEA and detailed instructions given to patient for usage of PCEA pump.  Prep:  Pt Position:sitting  Sterile Tech:cap, gloves, mask and sterile barrier  Prep:chlorhexidine gluconate and isopropyl alcohol  Monitoring:blood pressure monitoring and EKG  Epidural Block Procedure:  Approach:midline  Guidance:landmark technique and palpation technique  Location:L3-L4  Needle Type:Tuohy  Needle Gauge:17 G  Loss of Resistance Medium: saline  Loss of Resistance: 4cm  Cath Depth at skin:9 cm  Paresthesia: none  Aspiration:negative  Test Dose:negative  Number of Attempts: 1  Post Assessment:  Dressing:occlusive dressing applied and secured with tape  Pt Tolerance:patient tolerated the procedure well with no apparent complications  Complications:no

## 2023-10-09 NOTE — H&P
[facility]  Obstetric History and Physical    Chief Complaint   Patient presents with    Rupture of Membranes     OLINDA  37 weeks presents with c/o ofgush of clear fluid around 0310, and continued leaking since, denies any bleeding or ctx, reports +FM       Subjective     Patient is a 23 y.o. female  currently at 37w0d, who presents with SROM @0310.    Her prenatal care is benign.  Her previous obstetric/gynecological history is noted for is non-contributory.    The following portions of the patients history were reviewed and updated as appropriate: current medications, allergies, past medical history, past surgical history, past family history, past social history, and problem list .       Prenatal Information:  Prenatal Results       Initial Prenatal Labs       Test Value Reference Range Date Time    Hemoglobin  16.2 g/dL 12.0 - 15.9 23 1611       12.8 g/dL 11.1 - 15.9 23 1438    Hematocrit  44.6 % 34.0 - 46.6 23 1611       38.0 % 34.0 - 46.6 23 1438    Platelets  313 10*3/mm3 140 - 450 23 1611       239 x10E3/uL 150 - 450 23 1438    Rubella IgG  2.86 index Immune >0.99 23 1438    Hepatitis B SAg  Negative  Negative 23 1438    Hepatitis C Ab  Non Reactive  Non Reactive 23 1438    RPR  Non Reactive  Non Reactive 23 1438    T. Pallidum Ab         ABO  O   10/09/23 0642    Rh  Positive   10/09/23 0642    Antibody Screen  Negative  Negative 23 1438    HIV  Non Reactive  Non Reactive 23 1438    Urine Culture  Final report   23 1537    Gonorrhea  Negative  Negative 23 1545    Chlamydia  Negative  Negative 23 1545    TSH        HgB A1c         Varicella IgG  830 index Immune >165 23 1440    HgB Electrophoresis         Cystic fibrosis                   Fetal testing        Test Value Reference Range Date Time    NIPT        MSAFP        AFP-4                  2nd and 3rd Trimester       Test Value Reference Range  Date Time    Hemoglobin (repeated)  9.9 g/dL 12.0 - 15.9 10/09/23 0642    Hematocrit (repeated)  29.3 % 34.0 - 46.6 10/09/23 0642    Platelets   209 10*3/mm3 140 - 450 10/09/23 0642       313 10*3/mm3 140 - 450 04/13/23 1611       239 x10E3/uL 150 - 450 02/27/23 1438    GCT  70 mg/dL 65 - 139 07/28/23 1150    Antibody Screen (repeated)  Negative   10/09/23 0642       Negative  Negative 02/27/23 1438    GTT Fasting        GTT 1 Hr        GTT 2 Hr        GTT 3 Hr        Group B Strep                  Other testing        Test Value Reference Range Date Time    Parvo IgG         CMV IgG                   Drug Screening       Test Value Reference Range Date Time    Amphetamine Screen  Negative ng/mL Usonmg=6388 02/27/23 1537    Barbiturate Screen  Negative  Negative 04/13/23 2255       Negative ng/mL Xvrgoa=126 02/27/23 1537    Benzodiazepine Screen  Negative  Negative 04/13/23 2255       Negative ng/mL Vdfjss=835 02/27/23 1537    Methadone Screen  Negative  Negative 04/13/23 2255       Negative ng/mL Zhnspq=911 02/27/23 1537    Phencyclidine Screen  Negative ng/mL Cutoff=25 02/27/23 1537    Opiates Screen  Negative  Negative 04/13/23 2255       Negative ng/mL Rpkcen=316 02/27/23 1537    THC Screen  Positive  Negative 04/13/23 2255       Positive  Cutoff=50 02/27/23 1537    Cocaine Screen  Negative  Negative 04/13/23 2255       Negative ng/mL Sjuymd=338 02/27/23 1537    Propoxyphene Screen  Negative ng/mL Asnyki=148 02/27/23 1537    Buprenorphine Screen        Methamphetamine Screen        Oxycodone Screen  Negative  Negative 04/13/23 2255    Tricyclic Antidepressants Screen                  Legend    ^: Historical                          External Prenatal Results       Pregnancy Outside Results - Transcribed From Office Records - See Scanned Records For Details       Test Value Date Time    ABO  O  10/09/23 0642    Rh  Positive  10/09/23 0642    Antibody Screen  Negative  10/09/23 0642       Negative  02/27/23 1438     Varicella IgG  830 index 23 1440    Rubella  2.86 index 23 1438    Hgb  9.9 g/dL 10/09/23 0642       16.2 g/dL 23 1611       12.8 g/dL 23 1438    Hct  29.3 % 10/09/23 0642       44.6 % 23 1611       38.0 % 23 1438    Glucose Fasting GTT  CANCELED mg/dL 20 0906    Glucose Tolerance Test 1 hour  CANCELED  20 0906    Glucose Tolerance Test 3 hour  CANCELED  20 0906    Gonorrhea (discrete)  Negative  23 1545    Chlamydia (discrete)  Negative  23 1545    RPR  Non Reactive  23 1438    VDRL       Syphilis Antibody       HBsAg  Negative  23 1438    Herpes Simplex Virus PCR       Herpes Simplex VIrus Culture       HIV  Non Reactive  23 1438    Hep C RNA Quant PCR       Hep C Antibody  Non Reactive  23 1438    AFP       Group B Strep  Negative  20 0854    GBS Susceptibility to Clindamycin       GBS Susceptibility to Erythromycin       Fetal Fibronectin       Genetic Testing, Maternal Blood                 Drug Screening       Test Value Date Time    Urine Drug Screen       Amphetamine Screen  Negative ng/mL 23 1537    Barbiturate Screen  Negative  23 2255       Negative ng/mL 23 1537    Benzodiazepine Screen  Negative  23 2255       Negative ng/mL 23 1537    Methadone Screen  Negative  23 2255       Negative ng/mL 23 1537    Phencyclidine Screen  Negative ng/mL 23 1537    Opiates Screen  Negative  23 2255    THC Screen  Positive  23 2255    Cocaine Screen       Propoxyphene Screen  Negative ng/mL 23 1537    Buprenorphine Screen       Methamphetamine Screen       Oxycodone Screen  Negative  23 2255    Tricyclic Antidepressants Screen                 Legend    ^: Historical                             Past OB History:     OB History    Para Term  AB Living   2 1 1 0 0 1   SAB IAB Ectopic Molar Multiple Live Births   0 0 0 0 0 1      # Outcome Date  GA Lbr Sanjiv/2nd Weight Sex Delivery Anes PTL Lv   2 Current            1 Term 11/28/20 39w5d 11:19 / 00:49 3546 g (7 lb 13.1 oz) M Vag-Spont EPI N TAMMY      Birth Comments: scale 4      Name: TAYLOR JULIEN      Apgar1: 8  Apgar5: 9       Past Medical History: Past Medical History:   Diagnosis Date    Urogenital trichomoniasis       Past Surgical History Past Surgical History:   Procedure Laterality Date    WISDOM TOOTH EXTRACTION        Family History: Family History   Problem Relation Age of Onset    Hypertension Mother     No Known Problems Father     Breast cancer Neg Hx     Ovarian cancer Neg Hx     Uterine cancer Neg Hx     Colon cancer Neg Hx     Deep vein thrombosis Neg Hx     Pulmonary embolism Neg Hx       Social History:  reports that she has never smoked. She has never used smokeless tobacco.   reports that she does not currently use alcohol.   reports current drug use. Drug: Marijuana.        General ROS: Pertinent items are noted in HPI, all other systems reviewed and negative    Objective       Vital Signs Range for the last 24 hours  Temperature: Temp:  [97.4 øF (36.3 øC)-98.6 øF (37 øC)] (P) 98.6 øF (37 øC)   Temp Source: Temp src: (P) Oral   BP: BP: ()/(45-65) 102/49   Pulse: Heart Rate:  [75-98] 98   Respirations: Resp:  [16] (P) 16   SPO2: SpO2:  [98 %-100 %] 98 %   O2 Amount (l/min):     O2 Devices     Weight: Weight:  [78 kg (172 lb)-78.3 kg (172 lb 9.6 oz)] 78 kg (172 lb)     Physical Examination: General appearance - alert, well appearing, and in no distress and oriented to person, place, and time  Neck - supple, no significant adenopathy, bilateral symmetric anterior adenopathy  Chest - clear to auscultation, no wheezes, rales or rhonchi, symmetric air entry, no tachypnea, retractions or cyanosis  Heart - normal rate, regular rhythm, normal S1, S2, no murmurs, rubs, clicks or gallops, normal rate and regular rhythm  Abdomen - soft, nontender, nondistended, no masses or  "organomegaly  Gravid with FH at 37 week size.  Extremities - peripheral pulses normal, no pedal edema, no clubbing or cyanosis, no pedal edema noted    Presentation: Cephalic   Cervix: Exam by: Method: sterile exam per RN   Dilation: Cervical Dilation (cm): 4   Effacement: Cervical Effacement: 70-80%   Station:         Fetal Heart Rate Assessment   Method: Fetal HR Assessment Method: external   Beats/min: Fetal HR (beats/min): 140   Baseline: Fetal HR Baseline: normal range   Variability: Fetal HR Variability: moderate (amplitude range 6 to 25 bpm)   Accels: Fetal HR Accelerations: absent   Decels: Fetal HR Decelerations: absent   Tracing Category:       Uterine Assessment   Method: Method: external tocotransducer   Frequency (min): Contraction Frequency (Minutes): 4-8   Ctx Count in 10 min:     Duration:     Intensity: Contraction Intensity: mild by palpation   Intensity by IUPC:     Resting Tone: Uterine Resting Tone: soft by palpation   Resting Tone by IUPC:     [place] Units:       Laboratory Results: hgb. 9.9  Radiology Review: u/s on  8/24/2023:  Intrauterine pregnancy at 30w3d  Placental location: Posterior  Normal Interval growth  EFW:55.8%ile, AC:40.0%ile  Estimated fetal weight:  1651 g  Gender: female  Fetal position: Vertex  OSCAR: 15 cm  Fetal heart rate: 148 bpm     Relevant comparison data: No relevant comparison data     Edwin Montaño MD  8/24/2023 15:42 EDT     Other Studies: .    Assessment & Plan       Pregnancy        Assessment:  1.  Intrauterine pregnancy at 37w0d gestation with reactive, reassuring fetal status.    2.  labor  with ROM.  Grossly ruptured with regular contractions.  3.  Obstetrical history significant for is non-contributory.  4.  GBS status: No results found for: \"STREPGPB\"    Plan:  1. Vaginal anticipated, fetal and uterine monitoring  continuously, expectant management, and labor augmentation  Pitocin  2. Plan of care has been reviewed with patient and she " agrees.  3.  Risks, benefits of treatment plan have been discussed.  4.  All questions have been answered.  5.  .      Josiah Wang MD  10/9/2023  10:00 EDT

## 2023-10-10 LAB
BASOPHILS # BLD AUTO: 0.03 10*3/MM3 (ref 0–0.2)
BASOPHILS NFR BLD AUTO: 0.3 % (ref 0–1.5)
DEPRECATED RDW RBC AUTO: 34.5 FL (ref 37–54)
EOSINOPHIL # BLD AUTO: 0.1 10*3/MM3 (ref 0–0.4)
EOSINOPHIL NFR BLD AUTO: 1 % (ref 0.3–6.2)
ERYTHROCYTE [DISTWIDTH] IN BLOOD BY AUTOMATED COUNT: 11.8 % (ref 12.3–15.4)
HCT VFR BLD AUTO: 29.8 % (ref 34–46.6)
HGB BLD-MCNC: 9.9 G/DL (ref 12–15.9)
IMM GRANULOCYTES # BLD AUTO: 0.03 10*3/MM3 (ref 0–0.05)
IMM GRANULOCYTES NFR BLD AUTO: 0.3 % (ref 0–0.5)
LYMPHOCYTES # BLD AUTO: 2.13 10*3/MM3 (ref 0.7–3.1)
LYMPHOCYTES NFR BLD AUTO: 22 % (ref 19.6–45.3)
MCH RBC QN AUTO: 27.3 PG (ref 26.6–33)
MCHC RBC AUTO-ENTMCNC: 33.2 G/DL (ref 31.5–35.7)
MCV RBC AUTO: 82.1 FL (ref 79–97)
MONOCYTES # BLD AUTO: 0.96 10*3/MM3 (ref 0.1–0.9)
MONOCYTES NFR BLD AUTO: 9.9 % (ref 5–12)
NEUTROPHILS NFR BLD AUTO: 6.43 10*3/MM3 (ref 1.7–7)
NEUTROPHILS NFR BLD AUTO: 66.5 % (ref 42.7–76)
NRBC BLD AUTO-RTO: 0 /100 WBC (ref 0–0.2)
PLATELET # BLD AUTO: 177 10*3/MM3 (ref 140–450)
PMV BLD AUTO: 9.6 FL (ref 6–12)
RBC # BLD AUTO: 3.63 10*6/MM3 (ref 3.77–5.28)
WBC NRBC COR # BLD: 9.68 10*3/MM3 (ref 3.4–10.8)

## 2023-10-10 PROCEDURE — 85025 COMPLETE CBC W/AUTO DIFF WBC: CPT | Performed by: OBSTETRICS & GYNECOLOGY

## 2023-10-10 PROCEDURE — 0503F POSTPARTUM CARE VISIT: CPT | Performed by: OBSTETRICS & GYNECOLOGY

## 2023-10-10 RX ADMIN — IBUPROFEN 600 MG: 600 TABLET, FILM COATED ORAL at 11:04

## 2023-10-10 RX ADMIN — DOCUSATE SODIUM 100 MG: 100 CAPSULE, LIQUID FILLED ORAL at 11:04

## 2023-10-10 RX ADMIN — Medication 1 TABLET: at 11:04

## 2023-10-10 NOTE — PROGRESS NOTES
"Discharge Planning Assessment  [facility]     Patient Name: Bandar Rodriges  MRN: [id number]  Today's Date: 10/10/2023    Admit Date: 10/9/2023    Plan: Infant may discharge to mother when medically ready; CSW will follow cord. KARL Cortes.   Discharge Needs Assessment    No documentation.                  Discharge Plan       Row Name 10/10/23 1100       Plan    Plan Infant may discharge to mother when medically ready; CSW will follow cord. KARL Cortes.    Plan Comments Mother: Bandar Rodriges \"Rabia\", MRN: [id number]; infant: Feliciano \"Mary\" Antelmo, MRN: [id number]. CSW consulted for \"Mother +THC first trimester.\" Of note, mother's UDS was positive for THC prenatally on 2/27 & 4/13; mother's UDS was not collected on admit. Infant's UDS was missed; cord toxicology sent. CSW met with mother at bedside while father of infant/significant other was in the room. Mother gave consent for father to be present during assessment. Mother verified address, phone number, and insurance. Mother confirmed [employer] has spoken to her about adding infant to health insurance. Mother reports having a car seat, clothes, and diapers for infant. Mother shared she does not have a crib/bassinet for infant. Mother denied having any friends or family who could purchase a crib/bassinet for infant before discharge. CSW provided mother with a Pack N Play. Mother has one other child from a previous relationship: 3yr old male, who is being cared for by maternal grandparents during hospital stay. This is father's first baby. Mother reports, maternal grandparents, father of infant/ significant other, and other family members are available for support as needed. Mother reports infant is following up with [facility] after discharge; mother is comfortable scheduling appointments for infant and has semi reliable transportation. Mother shared she nor father own a car, but maternal grandma provides transportation when needed. CSW spoke " to mother about federated transportation and other transportation assistance programs. Mother voiced understanding. Mother is not current with [facility] and asked how to apply. CSW consulted the hospital WIC rep. CSW provided mother with a packet of resources including: WIC, HANDS, transportation, infant supplies, counseling, online support groups, postpartum mood and anxiety resources, and general community resources. CSW spent time building rapport with mother, and offered validation, support, and encouragement to mother throughout assessment. Mother and father were polite and appropriate, and denied having unmet needs or concerns at this time. CSW will follow cord toxicology and complete mandated reporting to CPS if warranted. KARL Cortes.                  Continued Care and Services - Admitted Since 10/9/2023    Coordination has not been started for this encounter.          Demographic Summary       Row Name 10/10/23 1059       General Information    Admission Type inpatient    Arrived From home    Referral Source nursing    Reason for Consult substance use concerns    General Information Comments Mother +THC first trimester.                   Functional Status       Row Name 10/10/23 1059       Functional Status, IADL    Medications independent    Meal Preparation independent    Housekeeping independent    Laundry independent    Shopping independent       Mental Status    General Appearance WDL WDL       Mental Status Summary    Recent Changes in Mental Status/Cognitive Functioning no changes       Employment/    Employment Status employed full-time    Employment/ Comments [employer].                   Psychosocial       Row Name 10/10/23 1059       Behavior WDL    Behavior WDL WDL       Emotion Mood WDL    Emotion/Mood/Affect WDL WDL       Speech WDL    Speech WDL WDL       Perceptual State WDL    Perceptual State WDL WDL       Thought Process WDL    Thought Process WDL WDL        Intellectual Performance WDL    Intellectual Performance WDL WDL       Coping/Stress    Major Change/Loss/Stressor birth    Patient Personal Strengths future/goal oriented;motivated;positive attitude;strong support system    Sources of Support parent(s);other family members;significant other                   Abuse/Neglect       Row Name 10/10/23 1100       Personal Safety    Physical Signs of Abuse Present no                   Legal    No documentation.                  Substance Abuse       Row Name 10/10/23 1100       Substance Use    Substance Use Comment No UDS mom or infant; cord tox sent.                   Patient Forms    No documentation.                     FIOR Paredes

## 2023-10-10 NOTE — ANESTHESIA POSTPROCEDURE EVALUATION
Patient: Bandar Rodriges    Procedure Summary       Date: 10/09/23 Room / Location:     Anesthesia Start: 0814 Anesthesia Stop: 1700    Procedure: LABOR ANALGESIA Diagnosis:     Scheduled Providers:  Provider: Keshav Woody MD    Anesthesia Type: epidural ASA Status: 2            Anesthesia Type: epidural    Vitals  Vitals Value Taken Time   /68 10/10/23 0704   Temp 36.9 øC (98.4 øF) 10/09/23 2356   Pulse 65 10/10/23 0704   Resp 16 10/10/23 0704   SpO2 100 % 10/09/23 2110           Post Anesthesia Care and Evaluation      Comments: Anesthesia present throughout labor and delivery

## 2023-10-10 NOTE — PROGRESS NOTES
Postpartum Progress Note      Status post Vaginal Delivery: Doing well postoperatively.     1) postpartum care immediately following delivery : Continue routine postpartum care.  2) anticipate discharge home tomorrow    Rh status: O+  Rubella: Immune  Gender: female    Subjective     Postpartum Day 1: Vaginal delivery    The patient feels well. The patient denies emotional concerns. Pain is well controlled with current medications. The baby is well. The patient is ambulating well. The patient is tolerating a normal diet.     Objective     Vital signs in last 24 hours:  Temp:  [97.1 øF (36.2 øC)-98.6 øF (37 øC)] 98.4 øF (36.9 øC)  Heart Rate:  [] 65  Resp:  [16-17] 16  BP: ()/() 113/68      General:    alert, appears stated age, and cooperative   Abdomen:  Soft, Non-tender    Lochia:  appropriate   Uterine Fundus:   firm   Ext    Edema trace   DVT Evaluation:  No evidence of DVT seen on physical exam.     Lab Results   Component Value Date    WBC 9.68 10/10/2023    HGB 9.9 (L) 10/10/2023    HCT 29.8 (L) 10/10/2023    MCV 82.1 10/10/2023     10/10/2023       Ginny Perry MD  10/10/2023  10:16 EDT

## 2023-10-10 NOTE — PLAN OF CARE
Goal Outcome Evaluation:  Plan of Care Reviewed With: patient        Progress: improving  Outcome Evaluation: VSS.  Pt up ad jesus and voiding without difficulty.  Fundal assessment and lochia, wnl.

## 2023-10-10 NOTE — LACTATION NOTE
This note was copied from a baby's chart.  P2 37w baby, 15 hrs old. Mom reports baby has been nursing well with 3 wet and 4 stools so far and she denies any problems breast feeding her first baby. Discussed  behaviors and how it can impact her milk supply. Hand pump given and encouraged to pump every 3hrs, feeding baby all EBM if baby becomes sleepy and does not breast feed very long. Prescription given for personal breast pump. Mom will call for any assistance today.

## 2023-10-11 VITALS
WEIGHT: 172 LBS | DIASTOLIC BLOOD PRESSURE: 54 MMHG | TEMPERATURE: 97.8 F | BODY MASS INDEX: 27 KG/M2 | HEIGHT: 67 IN | RESPIRATION RATE: 18 BRPM | OXYGEN SATURATION: 100 % | HEART RATE: 65 BPM | SYSTOLIC BLOOD PRESSURE: 101 MMHG

## 2023-10-11 PROCEDURE — 0503F POSTPARTUM CARE VISIT: CPT | Performed by: OBSTETRICS & GYNECOLOGY

## 2023-10-11 RX ORDER — HYDROCODONE BITARTRATE AND ACETAMINOPHEN 5; 325 MG/1; MG/1
1 TABLET ORAL EVERY 4 HOURS PRN
Qty: 6 TABLET | Refills: 0 | Status: SHIPPED | OUTPATIENT
Start: 2023-10-11 | End: 2023-10-16

## 2023-10-11 RX ORDER — IBUPROFEN 600 MG/1
600 TABLET ORAL EVERY 6 HOURS PRN
Qty: 30 TABLET | Refills: 1 | Status: SHIPPED | OUTPATIENT
Start: 2023-10-11

## 2023-10-11 RX ADMIN — Medication 1 TABLET: at 08:31

## 2023-10-11 RX ADMIN — DOCUSATE SODIUM 100 MG: 100 CAPSULE, LIQUID FILLED ORAL at 08:31

## 2023-10-11 RX ADMIN — IBUPROFEN 600 MG: 600 TABLET, FILM COATED ORAL at 08:31

## 2023-10-11 NOTE — LACTATION NOTE
Gave pt personal pump    Lactation Consult Note    Evaluation Completed: 10/11/2023 12:44 EDT  Patient Name: Bandar Rodriges  :  2000  MRN:  [id number]     REFERRAL  INFORMATION:                                         DELIVERY HISTORY:        Skin to skin initiation date/time: 10/9/2023  5:03 PM   Skin to skin end date/time: 10/9/2023  6:15 PM        MATERNAL ASSESSMENT:                               INFANT ASSESSMENT:  Information for the patient's :  RodrigesFeliciano kelley [[id number]]   No past medical history on file.                                                                                                   MATERNAL INFANT FEEDING:                                                                       EQUIPMENT TYPE:                                 BREAST PUMPING:          LACTATION REFERRALS:

## 2023-10-11 NOTE — PROGRESS NOTES
"Subjective:    Cc:  Postpartum    Postpartum Day 2:     The patient feels well.  Pain is well controlled with current medications. The baby is well.  Urinary output is adequate. The patient is ambulating well. The patient is tolerating a normal diet. Flatus has been passed.      Objective:    Vital signs in last 24 hours:  Blood pressure 101/54, pulse 65, temperature 97.8 øF (36.6 øC), temperature source Oral, resp. rate 18, height 170.2 cm (67\"), weight 78 kg (172 lb), last menstrual period 01/13/2023, SpO2 100%, currently breastfeeding.      General:    alert, appears stated age, and cooperative   Uterine Fundus:   firm     Labs    Rh + / Female infant    Assessment/Plan:.     Postpartum Day #2  - Discharge home.  Reviewed instructions, restrictions and follow up.      Edwin Montaño MD     "

## 2023-10-13 NOTE — PROGRESS NOTES
"Continued Stay Note  [facility]     Patient Name: Bandar Rodriges  MRN: [id number]  Today's Date: 10/13/2023    Admit Date: 10/9/2023    Plan: Infant may discharge to mother when medically ready; CSW will follow cord. KARL Cortes.   Discharge Plan       Row Name 10/13/23 1301       Plan    Plan Comments Mother: Bandar Rodriges \"Rabia\", MRN: [id number]; infant: Feliciano \"Mary\" Antelmo, MRN: [id number]. CSW reviewed cord toxicology for infant, and it was positive for Delta-9 Carboxy THC; this has been lab confirmed. CSW submitted a CPS report (WebID # [id number]). KARL Cortes.                   Discharge Codes    No documentation.                 Expected Discharge Date and Time       Expected Discharge Date Expected Discharge Time    Oct 11, 2023               FIOR Paredes    "

## 2023-11-20 ENCOUNTER — POSTPARTUM VISIT (OUTPATIENT)
Dept: OBSTETRICS AND GYNECOLOGY | Facility: CLINIC | Age: 23
End: 2023-11-20
Payer: COMMERCIAL

## 2023-11-20 VITALS
HEIGHT: 67 IN | SYSTOLIC BLOOD PRESSURE: 107 MMHG | WEIGHT: 158 LBS | DIASTOLIC BLOOD PRESSURE: 72 MMHG | BODY MASS INDEX: 24.8 KG/M2

## 2023-11-28 ENCOUNTER — PROCEDURE VISIT (OUTPATIENT)
Dept: OBSTETRICS AND GYNECOLOGY | Facility: CLINIC | Age: 23
End: 2023-11-28
Payer: COMMERCIAL

## 2023-11-28 VITALS
SYSTOLIC BLOOD PRESSURE: 102 MMHG | BODY MASS INDEX: 24.48 KG/M2 | WEIGHT: 156 LBS | HEIGHT: 67 IN | DIASTOLIC BLOOD PRESSURE: 69 MMHG

## 2023-11-28 DIAGNOSIS — Z30.017 NEXPLANON INSERTION: Primary | ICD-10-CM

## 2023-11-28 LAB
B-HCG UR QL: NEGATIVE
EXPIRATION DATE: NORMAL
INTERNAL NEGATIVE CONTROL: NEGATIVE
INTERNAL POSITIVE CONTROL: POSITIVE
Lab: NORMAL

## 2023-11-28 PROCEDURE — 11981 INSERTION DRUG DLVR IMPLANT: CPT | Performed by: OBSTETRICS & GYNECOLOGY

## 2023-11-28 PROCEDURE — 81025 URINE PREGNANCY TEST: CPT | Performed by: OBSTETRICS & GYNECOLOGY

## 2023-11-28 NOTE — PROGRESS NOTES
Subdermal Contraceptive Implant Insertion Note    Bandar Rodriges desires a subdermal etonogestrel contraceptive implant insertion.  She has been counseled regarding the risks, benefits and alternatives to the implant.  She especially understands that her menstrual periods are expected to become irregular and unpredictable throughout the time she is using the implant.  She has no contraindications to the insertion.  Her questions have been answered.  She has fully reviewed the FDA-approved consent brochure, has signed the consent form, and wishes to proceed with the insertion today.     Current method of contraception:  no method    No LMP recorded.    Urine pregnancy test: negative    Procedure Time Out Documentation       Procedure Details  The inner side of the left arm was cleaned with Betadinex3 and infiltrated with 1% lidocaine.  The contraceptive ruthy was inserted according to the 's instructions without complications.  The ruthy was palpable under the skin after the insertion.  The insertion site was closed with Steri-strip and a pressure dressing was applied.    Lot:  [id number]  Exp:  10/26/2025  NDC:  27145-179-50    Bandar was given post-insertion instructions.  She understands that the implant must be removed at the end of three years and may be removed sooner if she wishes.    Successful insertion of nexplanon device.    Patient tolerated the procedure well without complications.  Apply ice to insertion area and use NSAID for pain control.    Edwin Montaño MD

## 2024-01-30 ENCOUNTER — OFFICE VISIT (OUTPATIENT)
Dept: OBSTETRICS AND GYNECOLOGY | Facility: CLINIC | Age: 24
End: 2024-01-30
Payer: COMMERCIAL

## 2024-01-30 VITALS
DIASTOLIC BLOOD PRESSURE: 71 MMHG | BODY MASS INDEX: 24.01 KG/M2 | HEIGHT: 67 IN | WEIGHT: 153 LBS | SYSTOLIC BLOOD PRESSURE: 102 MMHG

## 2024-01-30 DIAGNOSIS — N93.8 DYSFUNCTIONAL UTERINE BLEEDING: Primary | ICD-10-CM

## 2024-01-30 RX ORDER — ESTRADIOL 2 MG/1
2 TABLET ORAL DAILY
Qty: 14 TABLET | Refills: 0 | Status: SHIPPED | OUTPATIENT
Start: 2024-01-30 | End: 2024-02-13

## 2024-01-30 NOTE — PROGRESS NOTES
Bennett Rodriges is a 23 y.o. female.     Cc:   Bleeding    History of Present Illness - Patient with Nexplanon in place since November.  She has had Nexplanon previously without problems; however, she reports ongoing bleeding with current implant.  She has not tried any therapies.  She also has mild to moderate cramping.    The following portions of the patient's history were reviewed and updated as appropriate: She  has a past medical history of Urogenital trichomoniasis.  She  reports that she has never smoked. She has never used smokeless tobacco. She reports that she does not currently use alcohol. She reports that she does not currently use drugs after having used the following drugs: Marijuana.  Current Outpatient Medications   Medication Sig Dispense Refill    estradiol (Estrace) 2 MG tablet Take 1 tablet by mouth Daily for 14 days. 14 tablet 0     No current facility-administered medications for this visit.     She has No Known Allergies..    Review of Systems   Constitutional:  Negative for chills and fever.   Genitourinary:  Positive for vaginal bleeding.       Objective   Physical Exam  Vitals reviewed. Exam conducted with a chaperone present.   Constitutional:       Appearance: Normal appearance.   Neurological:      Mental Status: She is alert.   Psychiatric:         Mood and Affect: Mood normal.         Behavior: Behavior normal.         Thought Content: Thought content normal.         Judgment: Judgment normal.         Assessment & Plan   Diagnoses and all orders for this visit:    1. Dysfunctional uterine bleeding (Primary)  -     estradiol (Estrace) 2 MG tablet; Take 1 tablet by mouth Daily for 14 days.  Dispense: 14 tablet; Refill: 0  -     Before removal of implant, I recommend trying estrogen to stabilize endometrium.  If this fails, consider pack of high-dose OCP.  Plan discussed with patient.    Edwin Montaño MD